# Patient Record
Sex: MALE | Race: BLACK OR AFRICAN AMERICAN | NOT HISPANIC OR LATINO | URBAN - METROPOLITAN AREA
[De-identification: names, ages, dates, MRNs, and addresses within clinical notes are randomized per-mention and may not be internally consistent; named-entity substitution may affect disease eponyms.]

---

## 2023-09-28 ENCOUNTER — INPATIENT (INPATIENT)
Facility: HOSPITAL | Age: 88
LOS: 6 days | Discharge: HOME | DRG: 689 | End: 2023-10-05
Attending: HOSPITALIST | Admitting: FAMILY MEDICINE
Payer: MEDICARE

## 2023-09-28 VITALS
OXYGEN SATURATION: 100 % | WEIGHT: 137.13 LBS | DIASTOLIC BLOOD PRESSURE: 59 MMHG | HEART RATE: 58 BPM | RESPIRATION RATE: 16 BRPM | TEMPERATURE: 98 F | SYSTOLIC BLOOD PRESSURE: 115 MMHG

## 2023-09-28 DIAGNOSIS — F01.50 VASCULAR DEMENTIA, UNSPECIFIED SEVERITY, WITHOUT BEHAVIORAL DISTURBANCE, PSYCHOTIC DISTURBANCE, MOOD DISTURBANCE, AND ANXIETY: ICD-10-CM

## 2023-09-28 DIAGNOSIS — R13.10 DYSPHAGIA, UNSPECIFIED: ICD-10-CM

## 2023-09-28 DIAGNOSIS — D61.818 OTHER PANCYTOPENIA: ICD-10-CM

## 2023-09-28 DIAGNOSIS — I10 ESSENTIAL (PRIMARY) HYPERTENSION: ICD-10-CM

## 2023-09-28 DIAGNOSIS — T68.XXXA HYPOTHERMIA, INITIAL ENCOUNTER: ICD-10-CM

## 2023-09-28 DIAGNOSIS — G93.40 ENCEPHALOPATHY, UNSPECIFIED: ICD-10-CM

## 2023-09-28 DIAGNOSIS — Z29.9 ENCOUNTER FOR PROPHYLACTIC MEASURES, UNSPECIFIED: ICD-10-CM

## 2023-09-28 DIAGNOSIS — G93.41 METABOLIC ENCEPHALOPATHY: ICD-10-CM

## 2023-09-28 DIAGNOSIS — N39.0 URINARY TRACT INFECTION, SITE NOT SPECIFIED: ICD-10-CM

## 2023-09-28 LAB
ALBUMIN SERPL ELPH-MCNC: 2.6 G/DL — LOW (ref 3.3–5)
ALP SERPL-CCNC: 70 U/L — SIGNIFICANT CHANGE UP (ref 40–120)
ALT FLD-CCNC: 42 U/L — SIGNIFICANT CHANGE UP (ref 12–78)
ANION GAP SERPL CALC-SCNC: 5 MMOL/L — SIGNIFICANT CHANGE UP (ref 5–17)
APTT BLD: 28.5 SEC — SIGNIFICANT CHANGE UP (ref 24.5–35.6)
AST SERPL-CCNC: 27 U/L — SIGNIFICANT CHANGE UP (ref 15–37)
BASOPHILS # BLD AUTO: 0.01 K/UL — SIGNIFICANT CHANGE UP (ref 0–0.2)
BASOPHILS NFR BLD AUTO: 0.2 % — SIGNIFICANT CHANGE UP (ref 0–2)
BILIRUB SERPL-MCNC: 0.6 MG/DL — SIGNIFICANT CHANGE UP (ref 0.2–1.2)
BUN SERPL-MCNC: 22 MG/DL — SIGNIFICANT CHANGE UP (ref 7–23)
CALCIUM SERPL-MCNC: 8.9 MG/DL — SIGNIFICANT CHANGE UP (ref 8.5–10.1)
CHLORIDE SERPL-SCNC: 109 MMOL/L — HIGH (ref 96–108)
CO2 SERPL-SCNC: 27 MMOL/L — SIGNIFICANT CHANGE UP (ref 22–31)
CREAT SERPL-MCNC: 0.88 MG/DL — SIGNIFICANT CHANGE UP (ref 0.5–1.3)
EGFR: 79 ML/MIN/1.73M2 — SIGNIFICANT CHANGE UP
EOSINOPHIL # BLD AUTO: 0.06 K/UL — SIGNIFICANT CHANGE UP (ref 0–0.5)
EOSINOPHIL NFR BLD AUTO: 1.2 % — SIGNIFICANT CHANGE UP (ref 0–6)
GLUCOSE SERPL-MCNC: 76 MG/DL — SIGNIFICANT CHANGE UP (ref 70–99)
HCT VFR BLD CALC: 28 % — LOW (ref 39–50)
HGB BLD-MCNC: 9 G/DL — LOW (ref 13–17)
IMM GRANULOCYTES NFR BLD AUTO: 0.2 % — SIGNIFICANT CHANGE UP (ref 0–0.9)
INR BLD: 0.98 RATIO — SIGNIFICANT CHANGE UP (ref 0.85–1.18)
LACTATE SERPL-SCNC: 1.2 MMOL/L — SIGNIFICANT CHANGE UP (ref 0.7–2)
LYMPHOCYTES # BLD AUTO: 0.39 K/UL — LOW (ref 1–3.3)
LYMPHOCYTES # BLD AUTO: 7.8 % — LOW (ref 13–44)
MCHC RBC-ENTMCNC: 28.9 PG — SIGNIFICANT CHANGE UP (ref 27–34)
MCHC RBC-ENTMCNC: 32.1 GM/DL — SIGNIFICANT CHANGE UP (ref 32–36)
MCV RBC AUTO: 90 FL — SIGNIFICANT CHANGE UP (ref 80–100)
MONOCYTES # BLD AUTO: 0.43 K/UL — SIGNIFICANT CHANGE UP (ref 0–0.9)
MONOCYTES NFR BLD AUTO: 8.6 % — SIGNIFICANT CHANGE UP (ref 2–14)
NEUTROPHILS # BLD AUTO: 4.11 K/UL — SIGNIFICANT CHANGE UP (ref 1.8–7.4)
NEUTROPHILS NFR BLD AUTO: 82 % — HIGH (ref 43–77)
NRBC # BLD: 0 /100 WBCS — SIGNIFICANT CHANGE UP (ref 0–0)
PLATELET # BLD AUTO: 91 K/UL — LOW (ref 150–400)
POTASSIUM SERPL-MCNC: 3.7 MMOL/L — SIGNIFICANT CHANGE UP (ref 3.5–5.3)
POTASSIUM SERPL-SCNC: 3.7 MMOL/L — SIGNIFICANT CHANGE UP (ref 3.5–5.3)
PROT SERPL-MCNC: 6.3 G/DL — SIGNIFICANT CHANGE UP (ref 6–8.3)
PROTHROM AB SERPL-ACNC: 11.5 SEC — SIGNIFICANT CHANGE UP (ref 9.5–13)
RAPID RVP RESULT: SIGNIFICANT CHANGE UP
RBC # BLD: 3.11 M/UL — LOW (ref 4.2–5.8)
RBC # FLD: 14.1 % — SIGNIFICANT CHANGE UP (ref 10.3–14.5)
SARS-COV-2 RNA SPEC QL NAA+PROBE: SIGNIFICANT CHANGE UP
SODIUM SERPL-SCNC: 141 MMOL/L — SIGNIFICANT CHANGE UP (ref 135–145)
TROPONIN I, HIGH SENSITIVITY RESULT: 26.8 NG/L — SIGNIFICANT CHANGE UP
TSH SERPL-MCNC: 3.45 UIU/ML — SIGNIFICANT CHANGE UP (ref 0.36–3.74)
WBC # BLD: 5.01 K/UL — SIGNIFICANT CHANGE UP (ref 3.8–10.5)
WBC # FLD AUTO: 5.01 K/UL — SIGNIFICANT CHANGE UP (ref 3.8–10.5)

## 2023-09-28 PROCEDURE — 99497 ADVNCD CARE PLAN 30 MIN: CPT | Mod: GC,25

## 2023-09-28 PROCEDURE — 71045 X-RAY EXAM CHEST 1 VIEW: CPT | Mod: 26

## 2023-09-28 PROCEDURE — 93010 ELECTROCARDIOGRAM REPORT: CPT

## 2023-09-28 PROCEDURE — 99285 EMERGENCY DEPT VISIT HI MDM: CPT

## 2023-09-28 PROCEDURE — 99223 1ST HOSP IP/OBS HIGH 75: CPT | Mod: GC

## 2023-09-28 RX ORDER — ESCITALOPRAM OXALATE 10 MG/1
1 TABLET, FILM COATED ORAL
Refills: 0 | DISCHARGE

## 2023-09-28 RX ORDER — CALCIUM CARBONATE 500(1250)
2 TABLET ORAL
Refills: 0 | DISCHARGE

## 2023-09-28 RX ORDER — CEFTRIAXONE 500 MG/1
1000 INJECTION, POWDER, FOR SOLUTION INTRAMUSCULAR; INTRAVENOUS EVERY 24 HOURS
Refills: 0 | Status: DISCONTINUED | OUTPATIENT
Start: 2023-09-29 | End: 2023-10-03

## 2023-09-28 RX ORDER — QUETIAPINE FUMARATE 200 MG/1
25 TABLET, FILM COATED ORAL
Refills: 0 | Status: DISCONTINUED | OUTPATIENT
Start: 2023-09-28 | End: 2023-10-05

## 2023-09-28 RX ORDER — HYDRALAZINE HCL 50 MG
10 TABLET ORAL THREE TIMES A DAY
Refills: 0 | Status: DISCONTINUED | OUTPATIENT
Start: 2023-09-28 | End: 2023-09-30

## 2023-09-28 RX ORDER — LOSARTAN POTASSIUM 100 MG/1
100 TABLET, FILM COATED ORAL DAILY
Refills: 0 | Status: DISCONTINUED | OUTPATIENT
Start: 2023-09-28 | End: 2023-10-05

## 2023-09-28 RX ORDER — SODIUM CHLORIDE 9 MG/ML
1000 INJECTION INTRAMUSCULAR; INTRAVENOUS; SUBCUTANEOUS ONCE
Refills: 0 | Status: COMPLETED | OUTPATIENT
Start: 2023-09-28 | End: 2023-09-28

## 2023-09-28 RX ORDER — ACETAMINOPHEN 500 MG
650 TABLET ORAL EVERY 6 HOURS
Refills: 0 | Status: DISCONTINUED | OUTPATIENT
Start: 2023-09-28 | End: 2023-10-05

## 2023-09-28 RX ORDER — ACETAMINOPHEN 500 MG
2 TABLET ORAL
Refills: 0 | DISCHARGE

## 2023-09-28 RX ORDER — CHOLECALCIFEROL (VITAMIN D3) 125 MCG
2000 CAPSULE ORAL DAILY
Refills: 0 | Status: DISCONTINUED | OUTPATIENT
Start: 2023-09-28 | End: 2023-10-05

## 2023-09-28 RX ORDER — HYDRALAZINE HCL 50 MG
1 TABLET ORAL
Refills: 0 | DISCHARGE

## 2023-09-28 RX ORDER — AMLODIPINE BESYLATE 2.5 MG/1
1 TABLET ORAL
Refills: 0 | DISCHARGE

## 2023-09-28 RX ORDER — ONDANSETRON 8 MG/1
4 TABLET, FILM COATED ORAL EVERY 8 HOURS
Refills: 0 | Status: DISCONTINUED | OUTPATIENT
Start: 2023-09-28 | End: 2023-10-05

## 2023-09-28 RX ORDER — LANOLIN ALCOHOL/MO/W.PET/CERES
3 CREAM (GRAM) TOPICAL AT BEDTIME
Refills: 0 | Status: DISCONTINUED | OUTPATIENT
Start: 2023-09-28 | End: 2023-10-05

## 2023-09-28 RX ORDER — SODIUM CHLORIDE 9 MG/ML
1000 INJECTION INTRAMUSCULAR; INTRAVENOUS; SUBCUTANEOUS
Refills: 0 | Status: DISCONTINUED | OUTPATIENT
Start: 2023-09-28 | End: 2023-09-28

## 2023-09-28 RX ORDER — HYDROCHLOROTHIAZIDE 25 MG
1 TABLET ORAL
Refills: 0 | DISCHARGE

## 2023-09-28 RX ORDER — SOD,AMMONIUM,POTASSIUM LACTATE
1 CREAM (GRAM) TOPICAL
Refills: 0 | DISCHARGE

## 2023-09-28 RX ORDER — TRAZODONE HCL 50 MG
100 TABLET ORAL AT BEDTIME
Refills: 0 | Status: DISCONTINUED | OUTPATIENT
Start: 2023-09-28 | End: 2023-10-05

## 2023-09-28 RX ORDER — SOD,AMMONIUM,POTASSIUM LACTATE
1 CREAM (GRAM) TOPICAL
Refills: 0 | Status: DISCONTINUED | OUTPATIENT
Start: 2023-09-28 | End: 2023-10-05

## 2023-09-28 RX ORDER — CHOLECALCIFEROL (VITAMIN D3) 125 MCG
1 CAPSULE ORAL
Refills: 0 | DISCHARGE

## 2023-09-28 RX ORDER — BRIMONIDINE TARTRATE 2 MG/MG
1 SOLUTION/ DROPS OPHTHALMIC
Refills: 0 | Status: DISCONTINUED | OUTPATIENT
Start: 2023-09-28 | End: 2023-10-05

## 2023-09-28 RX ORDER — DOCUSATE SODIUM 100 MG
1 CAPSULE ORAL
Refills: 0 | DISCHARGE

## 2023-09-28 RX ORDER — SODIUM CHLORIDE 9 MG/ML
1000 INJECTION INTRAMUSCULAR; INTRAVENOUS; SUBCUTANEOUS
Refills: 0 | Status: DISCONTINUED | OUTPATIENT
Start: 2023-09-28 | End: 2023-10-05

## 2023-09-28 RX ORDER — ENOXAPARIN SODIUM 100 MG/ML
30 INJECTION SUBCUTANEOUS
Refills: 0 | DISCHARGE

## 2023-09-28 RX ORDER — AMLODIPINE BESYLATE 2.5 MG/1
5 TABLET ORAL DAILY
Refills: 0 | Status: DISCONTINUED | OUTPATIENT
Start: 2023-09-28 | End: 2023-10-05

## 2023-09-28 RX ORDER — LOSARTAN POTASSIUM 100 MG/1
1 TABLET, FILM COATED ORAL
Refills: 0 | DISCHARGE

## 2023-09-28 RX ORDER — ESCITALOPRAM OXALATE 10 MG/1
10 TABLET, FILM COATED ORAL DAILY
Refills: 0 | Status: DISCONTINUED | OUTPATIENT
Start: 2023-09-28 | End: 2023-10-05

## 2023-09-28 RX ORDER — PIPERACILLIN AND TAZOBACTAM 4; .5 G/20ML; G/20ML
3.38 INJECTION, POWDER, LYOPHILIZED, FOR SOLUTION INTRAVENOUS ONCE
Refills: 0 | Status: COMPLETED | OUTPATIENT
Start: 2023-09-28 | End: 2023-09-28

## 2023-09-28 RX ORDER — QUETIAPINE FUMARATE 200 MG/1
1 TABLET, FILM COATED ORAL
Refills: 0 | DISCHARGE

## 2023-09-28 RX ORDER — LATANOPROST 0.05 MG/ML
1 SOLUTION/ DROPS OPHTHALMIC; TOPICAL AT BEDTIME
Refills: 0 | Status: DISCONTINUED | OUTPATIENT
Start: 2023-09-28 | End: 2023-10-05

## 2023-09-28 RX ORDER — LANOLIN ALCOHOL/MO/W.PET/CERES
1 CREAM (GRAM) TOPICAL
Refills: 0 | DISCHARGE

## 2023-09-28 RX ORDER — CALCIUM CARBONATE 500(1250)
1 TABLET ORAL DAILY
Refills: 0 | Status: DISCONTINUED | OUTPATIENT
Start: 2023-09-28 | End: 2023-10-05

## 2023-09-28 RX ORDER — LATANOPROST 0.05 MG/ML
1 SOLUTION/ DROPS OPHTHALMIC; TOPICAL
Refills: 0 | DISCHARGE

## 2023-09-28 RX ORDER — BRIMONIDINE TARTRATE 2 MG/MG
1 SOLUTION/ DROPS OPHTHALMIC
Refills: 0 | DISCHARGE

## 2023-09-28 RX ADMIN — SODIUM CHLORIDE 1000 MILLILITER(S): 9 INJECTION INTRAMUSCULAR; INTRAVENOUS; SUBCUTANEOUS at 19:30

## 2023-09-28 RX ADMIN — PIPERACILLIN AND TAZOBACTAM 200 GRAM(S): 4; .5 INJECTION, POWDER, LYOPHILIZED, FOR SOLUTION INTRAVENOUS at 17:30

## 2023-09-28 RX ADMIN — LATANOPROST 1 DROP(S): 0.05 SOLUTION/ DROPS OPHTHALMIC; TOPICAL at 23:45

## 2023-09-28 NOTE — H&P ADULT - CONVERSATION DETAILS
Spoke extensively with Luz Maria Kris, patient's niece. She says the patient has had worsening mentation and health over the past several years, especially over the past 2 months. When asked about DNR/DNI status, patient said "I want him die with dignity" and "he needs rest". Patient expressed understanding about current diagnosis, prognosis, and recurrence / worsening of symptoms given frequent UTIs. She is currently unable to visit the hospital as she does not drive, but will wait for another family member to assist her. She requests to be called every day.

## 2023-09-28 NOTE — H&P ADULT - NSICDXPASTMEDICALHX_GEN_ALL_CORE_FT
PAST MEDICAL HISTORY:  Dementia     Dysphagia     GERD (gastroesophageal reflux disease)     HLD (hyperlipidemia)     HTN (hypertension)

## 2023-09-28 NOTE — PATIENT PROFILE ADULT - FUNCTIONAL ASSESSMENT - BASIC MOBILITY 6.
1-calculated by average/Not able to assess (calculate score using [facility] averaging method)

## 2023-09-28 NOTE — H&P ADULT - HISTORY OF PRESENT ILLNESS
89 y/o M with PMHx vascular dementia, dysphagia, pancytopenia, HTN, GERD, constipation presenting to the ED from [facility] nursing home with worsening agitation and confusion. Patient is minimally verbal at baseline. Most HPI obtained from niece via phone.  He was hospitalized in [place] for a UTI in August. Afterwards, family decided patient could not live alone, and moved him to [facility] here in [place] in late August. Shortly afterwards, patient was admitted to [facility] for a UTI. He was discharge back to the nursing home. However, yesterday and today patient was more agitated and confused. Rocephin was started in nursing home for suspected UTI, and he was transferred to [facility]. Patient unable to participate in ROS, but does not appear in distress or discomfort.     ED course    Vitals: 97.8 F, HR 58, /59, RR 16, SpO2 100% RA  Labs: Hgb 9, Hct 28, Plt 91, Alb 2.6  EKG: NSR @ 67bpm,   Given: Zosyn, 1L NS bolus    CXR: no acute cardiopulmonary disease (personal read)

## 2023-09-28 NOTE — H&P ADULT - NSHPPHYSICALEXAM_GEN_ALL_CORE
T(C): 34.8 (09-28-23 @ 19:34), Max: 36.6 (09-28-23 @ 15:22)  HR: 58 (09-28-23 @ 15:22) (58 - 58)  BP: 115/59 (09-28-23 @ 15:22) (115/59 - 115/59)  RR: 16 (09-28-23 @ 15:22) (16 - 16)  SpO2: 100% (09-28-23 @ 15:22) (100% - 100%)    GENERAL: +cachectic, NAD  EYES: sclera clear, no exudates  ENMT: +leukoplakia, +dry mucous membranes  NECK: supple, soft, no thyromegaly noted  LUNGS: good air entry bilaterally, clear to auscultation, symmetric breath sounds, no wheezing or rhonchi appreciated  HEART: soft S1/S2, regular rate and rhythm, +systolic murmurs, no lower extremity edema  GASTROINTESTINAL: abdomen is soft, nontender, nondistended, normoactive bowel sounds, no palpable masses  INTEGUMENT: +poor skin turgor, no lesions noted  MUSCULOSKELETAL: no clubbing or cyanosis, no obvious deformity  NEUROLOGIC: +unable to assess mental state or complete full neurologic exam, moving all 4 limbs equally  HEME/LYMPH: no palpable supraclavicular nodules, no obvious ecchymosis or petechiae

## 2023-09-28 NOTE — H&P ADULT - PROBLEM SELECTOR PLAN 1
Recurrent UTIs over the past 2 months, each requiring hospitalization. Now p/w encephalopathy likely metabolic 2/2 UTI  - Hypothermic, other VSS. Lactate wnl, normal WBC  - s/p Rocephin in nursing home, and Zosyn in ED. Continue with Rocephin  - f/u blood and urine cultures, and UA. Urine will be collected after initiation of abx  - Continue warming blanket and bear-hugger. Tylenol prn fevers  - IVF  - ID (Dr. Gonzalez) consult

## 2023-09-28 NOTE — ED ADULT TRIAGE NOTE - CHIEF COMPLAINT QUOTE
c/o increased confusion for a few days- on inj rocephin Iv for urinary tract infection has an IV #24 on the right wrist from the nursing home

## 2023-09-28 NOTE — H&P ADULT - NSHPSOCIALHISTORY_GEN_ALL_CORE
Tobacco: former  Alcohol: denies  Recreational drugs: denies    Lives: at [facility]  ADLs: dependent  Ambulation: limited, with assistance

## 2023-09-28 NOTE — ED ADULT NURSE NOTE - OBJECTIVE STATEMENT
Pt received in bed alert and confuse and agitated with the c/o increase in confusion. As per Md's orders IV eliezer placed in right hand. Pt hypothermic and matt hugger applied and tolerating well. Nursing care ongoing and safety maintained.

## 2023-09-28 NOTE — ED PROVIDER NOTE - CLINICAL SUMMARY MEDICAL DECISION MAKING FREE TEXT BOX
89yo M ho dementia, encephalopathy, htn, bibems from nursing home for worsening agitation and dementia. pt unable to provide history, nonverbal, but per ems has been more agitation. per notes also states he is being treated for UTI with ceftriaxone  will check labs, temp, lytes, ua, reassess

## 2023-09-28 NOTE — H&P ADULT - PROBLEM SELECTOR PLAN 2
Chronic. Unknown baselines  - Hgb on admission 9, no recent bleeding per family  - order FOBT  - Plt 91 on admission, unknown etiology. Patient receives Lovenox 30mg qd at nursing home  - Heme/Onc consult if family agreeable Chronic. Unknown baselines  - Hgb on admission 9, no recent bleeding per family  - If hgb downtrends further, consider FOBT + anemia workup  - Check iron studies   - Plt 91 on admission, unknown etiology. Patient receives Lovenox 30mg qd at nursing home  - Hold chemical DVT ppx   - Heme/Onc consult only if family agreeable

## 2023-09-28 NOTE — ED PROVIDER NOTE - PHYSICAL EXAMINATION
Gen: NAD  Head: NC/AT  Neck: trachea midline  Resp:  No distress  cv: rr  abd nontender   Ext: no deformities  Neuro:  moving extremities, nonverbal, not folowing commands   Skin:  Warm and dry as visualized

## 2023-09-28 NOTE — H&P ADULT - PROBLEM SELECTOR PLAN 3
Chronic, progressive. Worsening agitation for several months, minimally verbal, cachectic   - Cont Zyprexa, Trazadone, Lexapro  - Palliative eval

## 2023-09-28 NOTE — H&P ADULT - PROBLEM SELECTOR PLAN 4
H/o dysphagia. Unknown etiology  - Diet in nursing home: pureed w/ thicken liquids   - Nutrition eval

## 2023-09-28 NOTE — ED ADULT NURSE NOTE - NSFALLHARMRISKINTERV_ED_ALL_ED
Assistance OOB with selected safe patient handling equipment if applicable/Assistance with ambulation/Communicate risk of Fall with Harm to all staff, patient, and family/Monitor gait and stability/Monitor for mental status changes and reorient to person, place, and time, as needed/Provide visual cue: red socks, yellow wristband, yellow gown, etc/Reinforce activity limits and safety measures with patient and family/Toileting schedule using arm’s reach rule for commode and bathroom/Use of alarms - bed, stretcher, chair and/or video monitoring/Bed in lowest position, wheels locked, appropriate side rails in place/Call bell, personal items and telephone in reach/Instruct patient to call for assistance before getting out of bed/chair/stretcher/Non-slip footwear applied when patient is off stretcher/Shirley to call system/Physically safe environment - no spills, clutter or unnecessary equipment/Purposeful Proactive Rounding/Room/bathroom lighting operational, light cord in reach

## 2023-09-28 NOTE — H&P ADULT - NS PRO AD PATIENT TYPE
Left the patient a message requesting she calls the office.    Health Care Proxy (HCP)/Living Will/Power of

## 2023-09-28 NOTE — PATIENT PROFILE ADULT - HEALTH LITERACY
no Reason To Defer Override: pt scheduled apt to excise cyst, wants to push out for later date. Detail Level: Detailed Introduction Text (Please End With A Colon): Deferred Size Of Lesion In Cm (Optional): 0

## 2023-09-28 NOTE — PATIENT PROFILE ADULT - FALL HARM RISK - HARM RISK INTERVENTIONS

## 2023-09-28 NOTE — H&P ADULT - ATTENDING COMMENTS
89 y/o M with PMHx vascular dementia, dysphagia, pancytopenia, HTN, GERD, constipation presenting to the ED from [facility] home with worsening agitation and confusion. Admitted for suspected UTI and metabolic encephalopathy Plan: f/u cultures, cont antibx for now, trend fever curve, apprec ID recs, MOLST completed GOC/Advance care planning as above, monitor clinical course, apprec palliative consult

## 2023-09-28 NOTE — ED PROVIDER NOTE - WR ORDER DATE AND TIME 1
Problem: Diabetes/Glucose Control  Goal: Glucose maintained within prescribed range  INTERVENTIONS:  - Monitor Blood Glucose as ordered  - Assess for signs and symptoms of hyperglycemia and hypoglycemia  - Administer ordered medications to maintain glucose 28-Sep-2023 15:56

## 2023-09-28 NOTE — ED PROVIDER NOTE - OBJECTIVE STATEMENT
89yo M ho dementia, encephalopathy, htn, bibems from nursing home for worsening agitation and dementia. pt unable to provide history, nonverbal, but per ems has been more agitation. per notes also states he is being treated for UTI with ceftriaxone

## 2023-09-28 NOTE — H&P ADULT - ASSESSMENT
89 y/o M with PMHx vascular dementia, dysphagia, pancytopenia, HTN, GERD, constipation presenting to the ED from [facility] nursing home with worsening agitation and confusion. Admitted for suspected UTI and metabolic encephalopathy

## 2023-09-29 LAB
ALBUMIN SERPL ELPH-MCNC: 2.4 G/DL — LOW (ref 3.3–5)
ALP SERPL-CCNC: 62 U/L — SIGNIFICANT CHANGE UP (ref 40–120)
ALT FLD-CCNC: 36 U/L — SIGNIFICANT CHANGE UP (ref 12–78)
ANION GAP SERPL CALC-SCNC: 8 MMOL/L — SIGNIFICANT CHANGE UP (ref 5–17)
APPEARANCE UR: CLEAR — SIGNIFICANT CHANGE UP
APTT BLD: 24.9 SEC — SIGNIFICANT CHANGE UP (ref 24.5–35.6)
AST SERPL-CCNC: 23 U/L — SIGNIFICANT CHANGE UP (ref 15–37)
BACTERIA # UR AUTO: ABNORMAL /HPF
BASOPHILS # BLD AUTO: 0 K/UL — SIGNIFICANT CHANGE UP (ref 0–0.2)
BASOPHILS NFR BLD AUTO: 0 % — SIGNIFICANT CHANGE UP (ref 0–2)
BILIRUB SERPL-MCNC: 0.9 MG/DL — SIGNIFICANT CHANGE UP (ref 0.2–1.2)
BILIRUB UR-MCNC: NEGATIVE — SIGNIFICANT CHANGE UP
BUN SERPL-MCNC: 22 MG/DL — SIGNIFICANT CHANGE UP (ref 7–23)
CALCIUM SERPL-MCNC: 8.7 MG/DL — SIGNIFICANT CHANGE UP (ref 8.5–10.1)
CHLORIDE SERPL-SCNC: 109 MMOL/L — HIGH (ref 96–108)
CO2 SERPL-SCNC: 24 MMOL/L — SIGNIFICANT CHANGE UP (ref 22–31)
COLOR SPEC: YELLOW — SIGNIFICANT CHANGE UP
COMMENT - URINE: SIGNIFICANT CHANGE UP
CORTIS AM PEAK SERPL-MCNC: 9 UG/DL — SIGNIFICANT CHANGE UP (ref 6–18.4)
CREAT SERPL-MCNC: 1 MG/DL — SIGNIFICANT CHANGE UP (ref 0.5–1.3)
DIFF PNL FLD: ABNORMAL
EGFR: 69 ML/MIN/1.73M2 — SIGNIFICANT CHANGE UP
EOSINOPHIL # BLD AUTO: 0.01 K/UL — SIGNIFICANT CHANGE UP (ref 0–0.5)
EOSINOPHIL NFR BLD AUTO: 0.2 % — SIGNIFICANT CHANGE UP (ref 0–6)
EPI CELLS # UR: SIGNIFICANT CHANGE UP
FERRITIN SERPL-MCNC: 154 NG/ML — SIGNIFICANT CHANGE UP (ref 30–400)
GLUCOSE SERPL-MCNC: 66 MG/DL — LOW (ref 70–99)
GLUCOSE UR QL: NEGATIVE MG/DL — SIGNIFICANT CHANGE UP
HCT VFR BLD CALC: 24.3 % — LOW (ref 39–50)
HGB BLD-MCNC: 8.1 G/DL — LOW (ref 13–17)
IMM GRANULOCYTES NFR BLD AUTO: 0.2 % — SIGNIFICANT CHANGE UP (ref 0–0.9)
INR BLD: 1.08 RATIO — SIGNIFICANT CHANGE UP (ref 0.85–1.18)
IRON SATN MFR SERPL: 28 % — SIGNIFICANT CHANGE UP (ref 16–55)
IRON SATN MFR SERPL: 55 UG/DL — SIGNIFICANT CHANGE UP (ref 45–165)
KETONES UR-MCNC: ABNORMAL MG/DL
LEUKOCYTE ESTERASE UR-ACNC: ABNORMAL
LYMPHOCYTES # BLD AUTO: 0.35 K/UL — LOW (ref 1–3.3)
LYMPHOCYTES # BLD AUTO: 7.3 % — LOW (ref 13–44)
MCHC RBC-ENTMCNC: 29.6 PG — SIGNIFICANT CHANGE UP (ref 27–34)
MCHC RBC-ENTMCNC: 33.3 GM/DL — SIGNIFICANT CHANGE UP (ref 32–36)
MCV RBC AUTO: 88.7 FL — SIGNIFICANT CHANGE UP (ref 80–100)
MONOCYTES # BLD AUTO: 0.48 K/UL — SIGNIFICANT CHANGE UP (ref 0–0.9)
MONOCYTES NFR BLD AUTO: 10 % — SIGNIFICANT CHANGE UP (ref 2–14)
NEUTROPHILS # BLD AUTO: 3.96 K/UL — SIGNIFICANT CHANGE UP (ref 1.8–7.4)
NEUTROPHILS NFR BLD AUTO: 82.3 % — HIGH (ref 43–77)
NITRITE UR-MCNC: NEGATIVE — SIGNIFICANT CHANGE UP
NRBC # BLD: 0 /100 WBCS — SIGNIFICANT CHANGE UP (ref 0–0)
PH UR: 7 — SIGNIFICANT CHANGE UP (ref 5–8)
PLATELET # BLD AUTO: 89 K/UL — LOW (ref 150–400)
POTASSIUM SERPL-MCNC: 3.7 MMOL/L — SIGNIFICANT CHANGE UP (ref 3.5–5.3)
POTASSIUM SERPL-SCNC: 3.7 MMOL/L — SIGNIFICANT CHANGE UP (ref 3.5–5.3)
PROT SERPL-MCNC: 6.1 G/DL — SIGNIFICANT CHANGE UP (ref 6–8.3)
PROT UR-MCNC: NEGATIVE MG/DL — SIGNIFICANT CHANGE UP
PROTHROM AB SERPL-ACNC: 12.6 SEC — SIGNIFICANT CHANGE UP (ref 9.5–13)
RBC # BLD: 2.74 M/UL — LOW (ref 4.2–5.8)
RBC # FLD: 14.2 % — SIGNIFICANT CHANGE UP (ref 10.3–14.5)
RBC CASTS # UR COMP ASSIST: ABNORMAL /HPF
SODIUM SERPL-SCNC: 141 MMOL/L — SIGNIFICANT CHANGE UP (ref 135–145)
SP GR SPEC: 1.02 — SIGNIFICANT CHANGE UP (ref 1–1.03)
TIBC SERPL-MCNC: 200 UG/DL — LOW (ref 220–430)
TRANSFERRIN SERPL-MCNC: 138 MG/DL — LOW (ref 200–360)
UIBC SERPL-MCNC: 145 UG/DL — SIGNIFICANT CHANGE UP (ref 110–370)
UROBILINOGEN FLD QL: 2 MG/DL (ref 0.2–1)
WBC # BLD: 4.81 K/UL — SIGNIFICANT CHANGE UP (ref 3.8–10.5)
WBC # FLD AUTO: 4.81 K/UL — SIGNIFICANT CHANGE UP (ref 3.8–10.5)
WBC UR QL: 1 /HPF — SIGNIFICANT CHANGE UP (ref 0–5)

## 2023-09-29 PROCEDURE — 99233 SBSQ HOSP IP/OBS HIGH 50: CPT

## 2023-09-29 RX ORDER — OLANZAPINE 15 MG/1
2.5 TABLET, FILM COATED ORAL EVERY 6 HOURS
Refills: 0 | Status: DISCONTINUED | OUTPATIENT
Start: 2023-09-29 | End: 2023-10-04

## 2023-09-29 RX ORDER — QUETIAPINE FUMARATE 200 MG/1
50 TABLET, FILM COATED ORAL AT BEDTIME
Refills: 0 | Status: DISCONTINUED | OUTPATIENT
Start: 2023-09-29 | End: 2023-10-01

## 2023-09-29 RX ADMIN — Medication 1 APPLICATION(S): at 17:28

## 2023-09-29 RX ADMIN — LATANOPROST 1 DROP(S): 0.05 SOLUTION/ DROPS OPHTHALMIC; TOPICAL at 21:15

## 2023-09-29 RX ADMIN — Medication 1 TABLET(S): at 11:46

## 2023-09-29 RX ADMIN — Medication 100 MILLIGRAM(S): at 21:15

## 2023-09-29 RX ADMIN — Medication 3 MILLIGRAM(S): at 21:15

## 2023-09-29 RX ADMIN — Medication 2000 UNIT(S): at 11:46

## 2023-09-29 RX ADMIN — CEFTRIAXONE 100 MILLIGRAM(S): 500 INJECTION, POWDER, FOR SOLUTION INTRAMUSCULAR; INTRAVENOUS at 11:46

## 2023-09-29 RX ADMIN — BRIMONIDINE TARTRATE 1 DROP(S): 2 SOLUTION/ DROPS OPHTHALMIC at 17:28

## 2023-09-29 RX ADMIN — QUETIAPINE FUMARATE 25 MILLIGRAM(S): 200 TABLET, FILM COATED ORAL at 17:28

## 2023-09-29 RX ADMIN — BRIMONIDINE TARTRATE 1 DROP(S): 2 SOLUTION/ DROPS OPHTHALMIC at 05:52

## 2023-09-29 RX ADMIN — Medication 650 MILLIGRAM(S): at 07:23

## 2023-09-29 RX ADMIN — Medication 650 MILLIGRAM(S): at 06:59

## 2023-09-29 RX ADMIN — LOSARTAN POTASSIUM 100 MILLIGRAM(S): 100 TABLET, FILM COATED ORAL at 05:51

## 2023-09-29 RX ADMIN — Medication 1 APPLICATION(S): at 05:51

## 2023-09-29 RX ADMIN — ESCITALOPRAM OXALATE 10 MILLIGRAM(S): 10 TABLET, FILM COATED ORAL at 11:46

## 2023-09-29 RX ADMIN — AMLODIPINE BESYLATE 5 MILLIGRAM(S): 2.5 TABLET ORAL at 05:52

## 2023-09-29 RX ADMIN — QUETIAPINE FUMARATE 50 MILLIGRAM(S): 200 TABLET, FILM COATED ORAL at 21:14

## 2023-09-29 RX ADMIN — Medication 10 MILLIGRAM(S): at 21:15

## 2023-09-29 RX ADMIN — Medication 10 MILLIGRAM(S): at 13:02

## 2023-09-29 RX ADMIN — Medication 10 MILLIGRAM(S): at 05:51

## 2023-09-29 RX ADMIN — QUETIAPINE FUMARATE 25 MILLIGRAM(S): 200 TABLET, FILM COATED ORAL at 05:53

## 2023-09-29 NOTE — CARE COORDINATION ASSESSMENT. - MET/SPOKE WITH
SW spoke with pt's niece to confirm plan for return to [facility] upon DC/patient/caregiver

## 2023-09-29 NOTE — DIETITIAN INITIAL EVALUATION ADULT - ADD RECOMMEND
1) Continue current diet rx  2) Recommend ensure plus HP BID (350kcal, 20gm protein per 8 fl oz serving), will also provide magic cup fortified ice cream daily (290kcal, 9gm protein per 4 oz cup)  3) Monitor po intake, diet tolerance, weight trends, labs, GI function, skin integrity

## 2023-09-29 NOTE — DIETITIAN NUTRITION RISK NOTIFICATION - TREATMENT: THE FOLLOWING DIET HAS BEEN RECOMMENDED
Diet, Pureed:   DASH/TLC {Sodium & Cholesterol Restricted}  Mildly Thick Liquids (MILDTHICKLIQS) (09-28-23 @ 21:31) [Active]

## 2023-09-29 NOTE — PROGRESS NOTE ADULT - PROBLEM SELECTOR PLAN 2
Chronic. Unknown baselines  - Hgb on admission 9, no recent bleeding per family  - If hgb downtrends further, consider FOBT + anemia workup  - Check iron studies   - Plt 91 on admission, unknown etiology. Patient receives Lovenox 30mg qd at nursing home  - Hold chemical DVT ppx   - Heme/Onc consult only if family agreeable Chronic, progressive. Worsening agitation for several months, minimally verbal, cachectic   - Cont Zyprexa, Trazadone, Lexapro  - Palliative eval  -DNR/DNI

## 2023-09-29 NOTE — DIETITIAN INITIAL EVALUATION ADULT - SIGNS/SYMPTOMS
as evidenced by hx of dysphagia, on puree consistency diet with mildly thick liquids PTA.  as evidenced by NFPE findings- moderate/severe muscle depletion/fat loss.

## 2023-09-29 NOTE — CONSULT NOTE ADULT - SUBJECTIVE AND OBJECTIVE BOX
[facility] DIVISION of INFECTIOUS DISEASE  Haris Gonzalez MD PhD, Clara Lau MD, Robyn Lino MD, Celia Davenport MD, Brad Shields MD  and providing coverage with Nahum Garcia MD  Providing Infectious Disease Consultations at [facility]    Office# [phone] to schedule follow up appointments  Answering Service for urgent calls or New Consults [phone]  Cell# to text for urgent issues Haris Gonzalez [phone]     HPI:  89 y/o M with PMHx vascular dementia, dysphagia, pancytopenia, HTN, GERD, constipation presenting to the ED from [facility] home with worsening agitation and confusion. Patient is minimally verbal at baseline. Pt was hospitalized in [place] for a UTI in August. Afterwards, family decided patient could not live alone, and moved him to [facility] here in [place] in late August. Shortly afterwards, patient was admitted to [facility] for a UTI. He was discharge back to the nursing home. However, patient was more agitated and confused. Rocephin was started in nursing home for suspected UTI, and he was transferred to [facility].       PAST MEDICAL & SURGICAL HISTORY:  Dementia      HTN (hypertension)      GERD (gastroesophageal reflux disease)      Dysphagia      HLD (hyperlipidemia)      No significant past surgical history          Antimicrobials  cefTRIAXone   IVPB 1000 milliGRAM(s) IV Intermittent every 24 hours      Immunological      Other  acetaminophen     Tablet .. 650 milliGRAM(s) Oral every 6 hours PRN  aluminum hydroxide/magnesium hydroxide/simethicone Suspension 30 milliLiter(s) Oral every 4 hours PRN  amLODIPine   Tablet 5 milliGRAM(s) Oral daily  ammonium lactate 12% Lotion 1 Application(s) Topical two times a day  brimonidine 0.2% Ophthalmic Solution 1 Drop(s) Both EYES two times a day  calcium carbonate   1250 mG (OsCal) 1 Tablet(s) Oral daily  cholecalciferol 2000 Unit(s) Oral daily  escitalopram 10 milliGRAM(s) Oral daily  hydrALAZINE 10 milliGRAM(s) Oral three times a day  hydrochlorothiazide 25 milliGRAM(s) Oral daily  latanoprost 0.005% Ophthalmic Solution 1 Drop(s) Both EYES at bedtime  losartan 100 milliGRAM(s) Oral daily  melatonin 3 milliGRAM(s) Oral at bedtime  OLANZapine Injectable 2.5 milliGRAM(s) IntraMuscular every 6 hours PRN  ondansetron Injectable 4 milliGRAM(s) IV Push every 8 hours PRN  QUEtiapine 50 milliGRAM(s) Oral at bedtime  QUEtiapine 25 milliGRAM(s) Oral two times a day  sodium chloride 0.9%. 1000 milliLiter(s) IV Continuous <Continuous>  traZODone 100 milliGRAM(s) Oral at bedtime      Allergies    No Known Allergies    Intolerances        SOCIAL HISTORY:  Social History:  Tobacco: former  Alcohol: denies  Recreational drugs: denies    Lives: at [facility]  ADLs: dependent  Ambulation: limited, with assistance (28 Sep 2023 20:32)      FAMILY HISTORY:  FH: coronary artery disease (Mother, Sibling)        ROS:    limited due to cognitive function    Vital Signs Last 24 Hrs  T(C): 38 (29 Sep 2023 06:31), Max: 38.1 (29 Sep 2023 04:35)  T(F): 100.4 (29 Sep 2023 06:31), Max: 100.6 (29 Sep 2023 04:35)  HR: 82 (29 Sep 2023 04:35) (58 - 82)  BP: 150/61 (29 Sep 2023 04:35) (100/54 - 150/61)  BP(mean): --  RR: 19 (29 Sep 2023 04:35) (16 - 19)  SpO2: 97% (29 Sep 2023 04:35) (93% - 100%)    Parameters below as of 29 Sep 2023 04:35  Patient On (Oxygen Delivery Method): room air        PE:  frail elderly male  HEENT:  NC, PERRL, sclerae anicteric, conjunctivae clear, EOMI.  Sinuses nontender, no nasal exudate.  No buccal or pharyngeal lesions, erythema or exudate  Neck:  Supple, no adenopathy  Lungs:  No accessory muscle use, bilaterally clear to auscultation  Cor:  distant  Abd:  Symmetric, normoactive BS.  Soft, nontender, no masses, guarding or rebound.  Liver and spleen not enlarged  Extrem:  No cyanosis or edema  Skin:  No rashes.  Neuro: limited        LABS:                        8.1    4.81  )-----------( 89       ( 29 Sep 2023 05:22 )             24.3       WBC Count: 4.81 K/uL (09-29-23 @ 05:22)  WBC Count: 5.01 K/uL (09-28-23 @ 17:05)      09-29    141  |  109<H>  |  22  ----------------------------<  66<L>  3.7   |  24  |  1.00    Ca    8.7      29 Sep 2023 05:22    TPro  6.1  /  Alb  2.4<L>  /  TBili  0.9  /  DBili  x   /  AST  23  /  ALT  36  /  AlkPhos  62  09-29      Creatinine: 1.00 mg/dL (09-29-23 @ 05:22)  Creatinine: 0.88 mg/dL (09-28-23 @ 17:05)      Urinalysis Basic - ( 29 Sep 2023 05:22 )    Color: x / Appearance: x / SG: x / pH: x  Gluc: 66 mg/dL / Ketone: x  / Bili: x / Urobili: x   Blood: x / Protein: x / Nitrite: x   Leuk Esterase: x / RBC: x / WBC x   Sq Epi: x / Non Sq Epi: x / Bacteria: x        MICROBIOLOGY:      RADIOLOGY & ADDITIONAL STUDIES:    --

## 2023-09-29 NOTE — DIETITIAN INITIAL EVALUATION ADULT - ORAL INTAKE PTA/DIET HISTORY
Pt admitted from [facility]. Reviewed transfer documents, pt was on a cardiac, puree consistency diet with mildly thick liquids PTA. Med Pass Fortified Nutritional Shake BID.

## 2023-09-29 NOTE — PROGRESS NOTE ADULT - SUBJECTIVE AND OBJECTIVE BOX
Patient is a 89y old  Male who presents with a chief complaint of Metabolic Encephalopathy (28 Sep 2023 20:32)      INTERVAL HPI/OVERNIGHT EVENTS: Patient seen and examined at bedside. Confused at baseline. Awake, No distress noted     MEDICATIONS  (STANDING):  amLODIPine   Tablet 5 milliGRAM(s) Oral daily  ammonium lactate 12% Lotion 1 Application(s) Topical two times a day  brimonidine 0.2% Ophthalmic Solution 1 Drop(s) Both EYES two times a day  calcium carbonate   1250 mG (OsCal) 1 Tablet(s) Oral daily  cefTRIAXone   IVPB 1000 milliGRAM(s) IV Intermittent every 24 hours  cholecalciferol 2000 Unit(s) Oral daily  escitalopram 10 milliGRAM(s) Oral daily  hydrALAZINE 10 milliGRAM(s) Oral three times a day  hydrochlorothiazide 25 milliGRAM(s) Oral daily  latanoprost 0.005% Ophthalmic Solution 1 Drop(s) Both EYES at bedtime  losartan 100 milliGRAM(s) Oral daily  melatonin 3 milliGRAM(s) Oral at bedtime  QUEtiapine 25 milliGRAM(s) Oral two times a day  sodium chloride 0.9%. 1000 milliLiter(s) (60 mL/Hr) IV Continuous <Continuous>  traZODone 100 milliGRAM(s) Oral at bedtime    MEDICATIONS  (PRN):  acetaminophen     Tablet .. 650 milliGRAM(s) Oral every 6 hours PRN Temp greater or equal to 38C (100.4F), Mild Pain (1 - 3)  aluminum hydroxide/magnesium hydroxide/simethicone Suspension 30 milliLiter(s) Oral every 4 hours PRN Dyspepsia  ondansetron Injectable 4 milliGRAM(s) IV Push every 8 hours PRN Nausea and/or Vomiting      Allergies    No Known Allergies    Intolerances        REVIEW OF SYSTEMS:  Unable to obtain due to mental status. Confused at baseline   Vital Signs Last 24 Hrs  T(C): 38 (29 Sep 2023 06:31), Max: 38.1 (29 Sep 2023 04:35)  T(F): 100.4 (29 Sep 2023 06:31), Max: 100.6 (29 Sep 2023 04:35)  HR: 82 (29 Sep 2023 04:35) (58 - 82)  BP: 150/61 (29 Sep 2023 04:35) (100/54 - 150/61)  BP(mean): --  RR: 19 (29 Sep 2023 04:35) (16 - 19)  SpO2: 97% (29 Sep 2023 04:35) (93% - 100%)    Parameters below as of 29 Sep 2023 04:35  Patient On (Oxygen Delivery Method): room air        PHYSICAL EXAM:  GENERAL: NAD, confused   HEAD:  Atraumatic, Normocephalic  EYES: EOMI, PERRLA, conjunctiva and sclera clear  ENMT: No tonsillar erythema, exudates, or enlargement; Moist mucous membranes  NECK: Supple, No JVD, Normal thyroid  CHEST/LUNG: Clear to auscultation bilaterally; No wheezing   HEART: S1S2+,  Regular rate and rhythm  ABDOMEN: Soft, Nontender, Nondistended; Bowel sounds present  EXTREMITIES:  2+ Peripheral Pulses, No clubbing, cyanosis  LYMPH: No lymphadenopathy noted  SKIN: No rashes, warm, dry     LABS:                        8.1    4.81  )-----------( 89       ( 29 Sep 2023 05:22 )             24.3     29 Sep 2023 05:22    141    |  109    |  22     ----------------------------<  66     3.7     |  24     |  1.00     Ca    8.7        29 Sep 2023 05:22    TPro  6.1    /  Alb  2.4    /  TBili  0.9    /  DBili  x      /  AST  23     /  ALT  36     /  AlkPhos  62     29 Sep 2023 05:22    PT/INR - ( 29 Sep 2023 05:22 )   PT: 12.6 sec;   INR: 1.08 ratio         PTT - ( 29 Sep 2023 05:22 )  PTT:24.9 sec  CAPILLARY BLOOD GLUCOSE        BLOOD CULTURE    RADIOLOGY & ADDITIONAL TESTS:    Imaging Personally Reviewed:  [ ] YES     Consultant(s) Notes Reviewed:      Care Discussed with Consultants/Other Providers: Patient is a 89y old  Male who presents with a chief complaint of Metabolic Encephalopathy (28 Sep 2023 20:32)      INTERVAL HPI/OVERNIGHT EVENTS: Patient seen and examined at bedside. Confused at baseline. Awake, No distress noted. + fever     MEDICATIONS  (STANDING):  amLODIPine   Tablet 5 milliGRAM(s) Oral daily  ammonium lactate 12% Lotion 1 Application(s) Topical two times a day  brimonidine 0.2% Ophthalmic Solution 1 Drop(s) Both EYES two times a day  calcium carbonate   1250 mG (OsCal) 1 Tablet(s) Oral daily  cefTRIAXone   IVPB 1000 milliGRAM(s) IV Intermittent every 24 hours  cholecalciferol 2000 Unit(s) Oral daily  escitalopram 10 milliGRAM(s) Oral daily  hydrALAZINE 10 milliGRAM(s) Oral three times a day  hydrochlorothiazide 25 milliGRAM(s) Oral daily  latanoprost 0.005% Ophthalmic Solution 1 Drop(s) Both EYES at bedtime  losartan 100 milliGRAM(s) Oral daily  melatonin 3 milliGRAM(s) Oral at bedtime  QUEtiapine 25 milliGRAM(s) Oral two times a day  sodium chloride 0.9%. 1000 milliLiter(s) (60 mL/Hr) IV Continuous <Continuous>  traZODone 100 milliGRAM(s) Oral at bedtime    MEDICATIONS  (PRN):  acetaminophen     Tablet .. 650 milliGRAM(s) Oral every 6 hours PRN Temp greater or equal to 38C (100.4F), Mild Pain (1 - 3)  aluminum hydroxide/magnesium hydroxide/simethicone Suspension 30 milliLiter(s) Oral every 4 hours PRN Dyspepsia  ondansetron Injectable 4 milliGRAM(s) IV Push every 8 hours PRN Nausea and/or Vomiting      Allergies    No Known Allergies    Intolerances        REVIEW OF SYSTEMS:  Unable to obtain due to mental status. Confused at baseline   Vital Signs Last 24 Hrs  T(C): 38 (29 Sep 2023 06:31), Max: 38.1 (29 Sep 2023 04:35)  T(F): 100.4 (29 Sep 2023 06:31), Max: 100.6 (29 Sep 2023 04:35)  HR: 82 (29 Sep 2023 04:35) (58 - 82)  BP: 150/61 (29 Sep 2023 04:35) (100/54 - 150/61)  BP(mean): --  RR: 19 (29 Sep 2023 04:35) (16 - 19)  SpO2: 97% (29 Sep 2023 04:35) (93% - 100%)    Parameters below as of 29 Sep 2023 04:35  Patient On (Oxygen Delivery Method): room air        PHYSICAL EXAM:  GENERAL: NAD, confused   HEAD:  Atraumatic, Normocephalic  EYES: EOMI, PERRLA, conjunctiva and sclera clear  ENMT: No tonsillar erythema, exudates, or enlargement; Moist mucous membranes  NECK: Supple, No JVD, Normal thyroid  CHEST/LUNG: Clear to auscultation bilaterally; No wheezing   HEART: S1S2+,  Regular rate and rhythm  ABDOMEN: Soft, Nontender, Nondistended; Bowel sounds present  EXTREMITIES:  2+ Peripheral Pulses, No clubbing, cyanosis  LYMPH: No lymphadenopathy noted  SKIN: No rashes, warm, dry     LABS:                        8.1    4.81  )-----------( 89       ( 29 Sep 2023 05:22 )             24.3     29 Sep 2023 05:22    141    |  109    |  22     ----------------------------<  66     3.7     |  24     |  1.00     Ca    8.7        29 Sep 2023 05:22    TPro  6.1    /  Alb  2.4    /  TBili  0.9    /  DBili  x      /  AST  23     /  ALT  36     /  AlkPhos  62     29 Sep 2023 05:22    PT/INR - ( 29 Sep 2023 05:22 )   PT: 12.6 sec;   INR: 1.08 ratio         PTT - ( 29 Sep 2023 05:22 )  PTT:24.9 sec  CAPILLARY BLOOD GLUCOSE        BLOOD CULTURE    RADIOLOGY & ADDITIONAL TESTS:    Imaging Personally Reviewed:  [ ] YES     Consultant(s) Notes Reviewed:      Care Discussed with Consultants/Other Providers:

## 2023-09-29 NOTE — CARE COORDINATION ASSESSMENT. - OTHER PERTINENT DISCHARGE PLANNING INFORMATION:
Pt is a resident for LTC at [facility], SW spoke with kavon Loera who states plan is for pt to return to [facility] upon DC.

## 2023-09-29 NOTE — DIETITIAN INITIAL EVALUATION ADULT - NS FNS DIET ORDER
Diet, Pureed:   DASH/TLC {Sodium & Cholesterol Restricted}  Mildly Thick Liquids (MILDTHICKLIQS) (09-28-23 @ 21:31)

## 2023-09-29 NOTE — CONSULT NOTE ADULT - ASSESSMENT
[facility] Infectious Diseases  Chart Reviewed-Full Consult to follow for any immediate concerns please fell free to contact us directly at  [phone] and have us paged or text my cell # [phone]  Haris Gonzalez MD PhD   89 y/o M with PMHx vascular dementia, dysphagia, pancytopenia, HTN, GERD, constipation presenting to the ED from [facility] with worsening agitation and confusion.    RECOMMENDATIONS    1-Altered Mental Status at this point of note patient is afebrile, no leukocytosis, clear pulm exam imaging. Urine not collected for urinalysis or culture and antibiotics already started. Pt not able to give any reliable reports of symptoms so challenging in terms of diagnosis. With reports of recent UTIs reasonable to treat with Ceftriaxone (started 9/28)  have asked RN to send urine for analysis and culture  recommend supportive care, hydration and recs to follow as picture is clarified.    Thank you for consulting us and involving us in the management of this most interesting and challenging case.  We will follow along in the care of this patient. Please call us at [phone] or text me directly on my cell# at [phone] with any concerns.

## 2023-09-29 NOTE — CARE COORDINATION ASSESSMENT. - SOCIAL WORK INTERVENTIONS
Pt to return to LTC at [facility]/coordination of care/referral to Sub-Acute Rehabilitation Facility

## 2023-09-29 NOTE — DIETITIAN INITIAL EVALUATION ADULT - CALCULATED FROM (ML/KG)
Patient with a contrast allergy. Order entered for predinsone, 50 mg, 3 tabs only and sheet provided to patient with dates/times when to take predisone and benadryl prior to CT on 09/21. Script sent to [address] [facility] in [place]. Patient verbalized understanding. [id number]

## 2023-09-29 NOTE — PROGRESS NOTE ADULT - PROBLEM SELECTOR PLAN 3
Chronic, progressive. Worsening agitation for several months, minimally verbal, cachectic   - Cont Zyprexa, Trazadone, Lexapro  - Palliative eval  -DNR/DNI H/o dysphagia. Unknown etiology  - Diet in nursing home: pureed w/ thicken liquids   - Nutrition eval

## 2023-09-29 NOTE — DIETITIAN INITIAL EVALUATION ADULT - PROBLEM SELECTOR PLAN 2
Chronic. Unknown baselines  - Hgb on admission 9, no recent bleeding per family  - If hgb downtrends further, consider FOBT + anemia workup  - Check iron studies   - Plt 91 on admission, unknown etiology. Patient receives Lovenox 30mg qd at nursing home  - Hold chemical DVT ppx   - Heme/Onc consult only if family agreeable

## 2023-09-29 NOTE — CAREGIVER ENGAGEMENT NOTE - CAREGIVER OUTREACH NOTES - FREE TEXT
RUTH spoke with pt's niece who states pt is a LTC resident at [facility], plan is return upon DC.

## 2023-09-29 NOTE — PROGRESS NOTE ADULT - PROBLEM SELECTOR PLAN 4
H/o dysphagia. Unknown etiology  - Diet in nursing home: pureed w/ thicken liquids   - Nutrition eval Chronic  - Cont Hydralazine, Losartan, HCTZ, Amlodipine

## 2023-09-29 NOTE — DIETITIAN INITIAL EVALUATION ADULT - PERTINENT LABORATORY DATA
09-29    141  |  109<H>  |  22  ----------------------------<  66<L>  3.7   |  24  |  1.00    Ca    8.7      29 Sep 2023 05:22    TPro  6.1  /  Alb  2.4<L>  /  TBili  0.9  /  DBili  x   /  AST  23  /  ALT  36  /  AlkPhos  62  09-29

## 2023-09-29 NOTE — DIETITIAN INITIAL EVALUATION ADULT - PERTINENT MEDS FT
MEDICATIONS  (STANDING):  amLODIPine   Tablet 5 milliGRAM(s) Oral daily  ammonium lactate 12% Lotion 1 Application(s) Topical two times a day  brimonidine 0.2% Ophthalmic Solution 1 Drop(s) Both EYES two times a day  calcium carbonate   1250 mG (OsCal) 1 Tablet(s) Oral daily  cefTRIAXone   IVPB 1000 milliGRAM(s) IV Intermittent every 24 hours  cholecalciferol 2000 Unit(s) Oral daily  escitalopram 10 milliGRAM(s) Oral daily  hydrALAZINE 10 milliGRAM(s) Oral three times a day  hydrochlorothiazide 25 milliGRAM(s) Oral daily  latanoprost 0.005% Ophthalmic Solution 1 Drop(s) Both EYES at bedtime  losartan 100 milliGRAM(s) Oral daily  melatonin 3 milliGRAM(s) Oral at bedtime  QUEtiapine 50 milliGRAM(s) Oral at bedtime  QUEtiapine 25 milliGRAM(s) Oral two times a day  sodium chloride 0.9%. 1000 milliLiter(s) (60 mL/Hr) IV Continuous <Continuous>  traZODone 100 milliGRAM(s) Oral at bedtime    MEDICATIONS  (PRN):  acetaminophen     Tablet .. 650 milliGRAM(s) Oral every 6 hours PRN Temp greater or equal to 38C (100.4F), Mild Pain (1 - 3)  aluminum hydroxide/magnesium hydroxide/simethicone Suspension 30 milliLiter(s) Oral every 4 hours PRN Dyspepsia  OLANZapine Injectable 2.5 milliGRAM(s) IntraMuscular every 6 hours PRN Agitation  ondansetron Injectable 4 milliGRAM(s) IV Push every 8 hours PRN Nausea and/or Vomiting

## 2023-09-29 NOTE — DIETITIAN INITIAL EVALUATION ADULT - NS FNS ENTERAL CURRENT ORDER
Pt resting quietly in mothers arms with nad. No needs voiced at this time.    Current diet order meets estimated nutrient requirements

## 2023-09-29 NOTE — PROGRESS NOTE ADULT - PROBLEM SELECTOR PLAN 1
Recurrent UTIs over the past 2 months, each requiring hospitalization. Now p/w encephalopathy likely metabolic 2/2 UTI  - Hypothermic, other VSS. Lactate wnl, normal WBC  - s/p Rocephin in nursing home, and Zosyn in ED. Continue with Rocephin  - f/u blood and urine cultures, and UA. Urine will be collected after initiation of abx  - Continue warming blanket and bear-hugger. Tylenol prn fevers  - IVF x 12 hours   - ID (Dr. Gonzalez) consult Recurrent UTIs over the past 2 months, each requiring hospitalization. Now p/w encephalopathy likely metabolic 2/2 UTI  - Hypothermic, other VSS. Lactate wnl, normal WBC  - s/p Rocephin in nursing home, and Zosyn in ED. Continue with Rocephin  - f/u blood and urine cultures, and UA. Urine will be collected after initiation of abx  - Continue warming blanket and bear-hugger. Tylenol prn fevers  - IVF x 12 hours   - ID (Dr. Gonzalez) consult  -Monitor h/h, counts Recurrent UTIs over the past 2 months, each requiring hospitalization. Now p/w encephalopathy likely metabolic 2/2 UTI  - + Fever, Tmax 100.6  -Tylenol PRN fever   - s/p Rocephin in nursing home, and Zosyn in ED. Continue with Rocephin  - f/u blood and urine cultures, and UA. Urine will be collected after initiation of abx  - Continue warming blanket and bear-hugger. Tylenol prn fevers  - IVF x 12 hours   - ID (Dr. Gonzalez) consult  -Monitor h/h, counts

## 2023-09-29 NOTE — CARE COORDINATION ASSESSMENT. - NSCAREPROVIDERS_GEN_ALL_CORE_FT
CARE PROVIDERS:  Accepting Physician: Saul Mitchell  Administration: Peter Dumont  Admitting: Saul Mitchell  Attending: Saul Mitchell  Case Management: Rosa Benoit  Case Management: Gee Lyon  Case Management: Dacia Bella  Consultant: Nalini Berg  Consultant: Haris Gonzalez  Covering Team: Mehul Gramajo ED Attending: Aura Baumann ED Nurse: Mary Jane Taylor  Nurse: Goldie Machuca  Nurse: Sondra Bailey  Nurse: Xena Cross  Ordered: ADM, User  Override: Olamide Jason  PCA/Nursing Assistant: Irena Flores  PCA/Nursing Assistant: Ayanna Inman  Physical Therapy: Chaparro Chandler  Primary Team: Balta Rockwell  Primary Team: Lennie Morales  Primary Team: Dong Busch  Registered Dietitian: Jacque Nam  Respiratory Therapy: Yenny Yan  : Yazmin Mckeon  : Brit Nielson  : Mariam Urbano  Team: [facility], Team

## 2023-09-29 NOTE — DIETITIAN INITIAL EVALUATION ADULT - OTHER INFO
Pt is a "89 y/o M with PMHx vascular dementia, dysphagia, pancytopenia, HTN, GERD, constipation presenting to the ED from [facility] with worsening agitation and confusion. Admitted for suspected UTI and metabolic encephalopathy."    Visited pt at bedside this am. Pt alert/confused during visit. Minimally verbal. Nutrition-related hx obtained from transfer papers. Pt currently on puree, DASH/TLC diet with mildly thick liquids. NKFA. No N/V/D/C per chart review. No BMs thus far. CBW on admission 137#. No ht documented in EMR, ht of 5'9" noted in transfer documents. No edema noted. Skin ecchymotic. Recommend ensure plus HP BID for additional kcal/protein, and magic cup fortified ice cream 4oz cup daily. Recommend continued assistance/encouragement with meals and oral nutritional supplements at meal times.

## 2023-09-30 PROCEDURE — 99233 SBSQ HOSP IP/OBS HIGH 50: CPT

## 2023-09-30 RX ORDER — SODIUM CHLORIDE 9 MG/ML
1000 INJECTION INTRAMUSCULAR; INTRAVENOUS; SUBCUTANEOUS ONCE
Refills: 0 | Status: COMPLETED | OUTPATIENT
Start: 2023-09-30 | End: 2023-09-30

## 2023-09-30 RX ORDER — HYDRALAZINE HCL 50 MG
25 TABLET ORAL EVERY 8 HOURS
Refills: 0 | Status: DISCONTINUED | OUTPATIENT
Start: 2023-09-30 | End: 2023-10-01

## 2023-09-30 RX ADMIN — Medication 100 MILLIGRAM(S): at 21:18

## 2023-09-30 RX ADMIN — BRIMONIDINE TARTRATE 1 DROP(S): 2 SOLUTION/ DROPS OPHTHALMIC at 17:43

## 2023-09-30 RX ADMIN — BRIMONIDINE TARTRATE 1 DROP(S): 2 SOLUTION/ DROPS OPHTHALMIC at 05:21

## 2023-09-30 RX ADMIN — Medication 2000 UNIT(S): at 11:43

## 2023-09-30 RX ADMIN — AMLODIPINE BESYLATE 5 MILLIGRAM(S): 2.5 TABLET ORAL at 05:22

## 2023-09-30 RX ADMIN — QUETIAPINE FUMARATE 25 MILLIGRAM(S): 200 TABLET, FILM COATED ORAL at 05:22

## 2023-09-30 RX ADMIN — QUETIAPINE FUMARATE 25 MILLIGRAM(S): 200 TABLET, FILM COATED ORAL at 17:43

## 2023-09-30 RX ADMIN — Medication 1 TABLET(S): at 11:43

## 2023-09-30 RX ADMIN — Medication 1 APPLICATION(S): at 17:43

## 2023-09-30 RX ADMIN — Medication 1 APPLICATION(S): at 05:23

## 2023-09-30 RX ADMIN — QUETIAPINE FUMARATE 50 MILLIGRAM(S): 200 TABLET, FILM COATED ORAL at 21:20

## 2023-09-30 RX ADMIN — SODIUM CHLORIDE 1000 MILLILITER(S): 9 INJECTION INTRAMUSCULAR; INTRAVENOUS; SUBCUTANEOUS at 22:10

## 2023-09-30 RX ADMIN — LATANOPROST 1 DROP(S): 0.05 SOLUTION/ DROPS OPHTHALMIC; TOPICAL at 21:18

## 2023-09-30 RX ADMIN — Medication 10 MILLIGRAM(S): at 05:21

## 2023-09-30 RX ADMIN — CEFTRIAXONE 100 MILLIGRAM(S): 500 INJECTION, POWDER, FOR SOLUTION INTRAMUSCULAR; INTRAVENOUS at 11:42

## 2023-09-30 RX ADMIN — Medication 3 MILLIGRAM(S): at 21:18

## 2023-09-30 RX ADMIN — ESCITALOPRAM OXALATE 10 MILLIGRAM(S): 10 TABLET, FILM COATED ORAL at 11:43

## 2023-09-30 RX ADMIN — LOSARTAN POTASSIUM 100 MILLIGRAM(S): 100 TABLET, FILM COATED ORAL at 05:22

## 2023-09-30 NOTE — PROGRESS NOTE ADULT - SUBJECTIVE AND OBJECTIVE BOX
Patient is a 89y old  Male who presents with a chief complaint of Hypothermia, initial encounter      INTERVAL HPI/OVERNIGHT EVENTS: Patient seen and examined at bedside. Awake, alert. Non verbal, confused at baseline     MEDICATIONS  (STANDING):  amLODIPine   Tablet 5 milliGRAM(s) Oral daily  ammonium lactate 12% Lotion 1 Application(s) Topical two times a day  brimonidine 0.2% Ophthalmic Solution 1 Drop(s) Both EYES two times a day  calcium carbonate   1250 mG (OsCal) 1 Tablet(s) Oral daily  cefTRIAXone   IVPB 1000 milliGRAM(s) IV Intermittent every 24 hours  cholecalciferol 2000 Unit(s) Oral daily  escitalopram 10 milliGRAM(s) Oral daily  hydrALAZINE 25 milliGRAM(s) Oral every 8 hours  hydrochlorothiazide 25 milliGRAM(s) Oral daily  latanoprost 0.005% Ophthalmic Solution 1 Drop(s) Both EYES at bedtime  losartan 100 milliGRAM(s) Oral daily  melatonin 3 milliGRAM(s) Oral at bedtime  QUEtiapine 50 milliGRAM(s) Oral at bedtime  QUEtiapine 25 milliGRAM(s) Oral two times a day  sodium chloride 0.9%. 1000 milliLiter(s) (60 mL/Hr) IV Continuous <Continuous>  traZODone 100 milliGRAM(s) Oral at bedtime    MEDICATIONS  (PRN):  acetaminophen     Tablet .. 650 milliGRAM(s) Oral every 6 hours PRN Temp greater or equal to 38C (100.4F), Mild Pain (1 - 3)  aluminum hydroxide/magnesium hydroxide/simethicone Suspension 30 milliLiter(s) Oral every 4 hours PRN Dyspepsia  OLANZapine Injectable 2.5 milliGRAM(s) IntraMuscular every 6 hours PRN Agitation  ondansetron Injectable 4 milliGRAM(s) IV Push every 8 hours PRN Nausea and/or Vomiting      Allergies    No Known Allergies    Intolerances        REVIEW OF SYSTEMS:  Unable to obtain due to confusion   Vital Signs Last 24 Hrs  T(C): 36.2 (30 Sep 2023 04:55), Max: 36.6 (29 Sep 2023 19:59)  T(F): 97.2 (30 Sep 2023 04:55), Max: 97.8 (29 Sep 2023 19:59)  HR: 64 (30 Sep 2023 04:55) (64 - 81)  BP: 182/95 (30 Sep 2023 04:55) (108/61 - 182/95)  BP(mean): --  RR: 19 (30 Sep 2023 04:55) (17 - 19)  SpO2: 96% (30 Sep 2023 04:55) (96% - 97%)    Parameters below as of 30 Sep 2023 04:55  Patient On (Oxygen Delivery Method): room air        PHYSICAL EXAM:  GENERAL: NAD, confused  HEAD:  Atraumatic, Normocephalic  EYES: EOMI, PERRLA, conjunctiva and sclera clear  ENMT: No tonsillar erythema, exudates, or enlargement; Moist mucous membranes  NECK: Supple, No JVD, Normal thyroid  CHEST/LUNG: Clear to auscultation bilaterally; No wheezing   HEART: S1S2+,  Regular rate and rhythm  ABDOMEN: Soft, Nontender, Nondistended; Bowel sounds present  EXTREMITIES:  2+ Peripheral Pulses, No clubbing, cyanosis  LYMPH: No lymphadenopathy noted  SKIN: No rashes, warm, dry   LABS:      Ca    8.7        29 Sep 2023 05:22      PT/INR - ( 29 Sep 2023 05:22 )   PT: 12.6 sec;   INR: 1.08 ratio         PTT - ( 29 Sep 2023 05:22 )  PTT:24.9 sec  CAPILLARY BLOOD GLUCOSE        BLOOD CULTURE  09-28 @ 17:05   No growth at 24 hours  --  --    RADIOLOGY & ADDITIONAL TESTS:    Imaging Personally Reviewed:  [ ] YES     Consultant(s) Notes Reviewed:      Care Discussed with Consultants/Other Providers:

## 2023-09-30 NOTE — PROGRESS NOTE ADULT - SUBJECTIVE AND OBJECTIVE BOX
[facility] DIVISION of INFECTIOUS DISEASE  Haris Gonzalez MD PhD, Clara Lau MD, Robyn Lino MD, Celia Davenport MD, Brad Shields MD  and providing coverage with Nahum Garcia MD  Providing Infectious Disease Consultations at [facility], [facility], [facility]'s    Office# [phone] to schedule follow up appointments  Answering Service for urgent calls or New Consults [phone]  Cell# to text for urgent issues Haris Gonzalez [phone]     infectious diseases progress note:    CELESTINE NUR is a 89y y. o. Male patient    Overnight and events of the last 24hrs reviewed    Allergies    No Known Allergies    Intolerances        ANTIBIOTICS/RELEVANT:  antimicrobials  cefTRIAXone   IVPB 1000 milliGRAM(s) IV Intermittent every 24 hours    immunologic:    OTHER:  acetaminophen     Tablet .. 650 milliGRAM(s) Oral every 6 hours PRN  aluminum hydroxide/magnesium hydroxide/simethicone Suspension 30 milliLiter(s) Oral every 4 hours PRN  amLODIPine   Tablet 5 milliGRAM(s) Oral daily  ammonium lactate 12% Lotion 1 Application(s) Topical two times a day  brimonidine 0.2% Ophthalmic Solution 1 Drop(s) Both EYES two times a day  calcium carbonate   1250 mG (OsCal) 1 Tablet(s) Oral daily  cholecalciferol 2000 Unit(s) Oral daily  escitalopram 10 milliGRAM(s) Oral daily  hydrALAZINE 25 milliGRAM(s) Oral every 8 hours  hydrochlorothiazide 25 milliGRAM(s) Oral daily  latanoprost 0.005% Ophthalmic Solution 1 Drop(s) Both EYES at bedtime  losartan 100 milliGRAM(s) Oral daily  melatonin 3 milliGRAM(s) Oral at bedtime  OLANZapine Injectable 2.5 milliGRAM(s) IntraMuscular every 6 hours PRN  ondansetron Injectable 4 milliGRAM(s) IV Push every 8 hours PRN  QUEtiapine 25 milliGRAM(s) Oral two times a day  QUEtiapine 50 milliGRAM(s) Oral at bedtime  sodium chloride 0.9%. 1000 milliLiter(s) IV Continuous <Continuous>  traZODone 100 milliGRAM(s) Oral at bedtime      Objective:  Vital Signs Last 24 Hrs  T(C): 36.2 (30 Sep 2023 04:55), Max: 36.6 (29 Sep 2023 19:59)  T(F): 97.2 (30 Sep 2023 04:55), Max: 97.8 (29 Sep 2023 19:59)  HR: 64 (30 Sep 2023 04:55) (64 - 81)  BP: 182/95 (30 Sep 2023 04:55) (108/61 - 182/95)  BP(mean): --  RR: 19 (30 Sep 2023 04:55) (17 - 19)  SpO2: 96% (30 Sep 2023 04:55) (96% - 97%)    Parameters below as of 30 Sep 2023 04:55  Patient On (Oxygen Delivery Method): room air        T(C): 36.2 (23 @ 04:55), Max: 38.1 (23 @ 04:35)  T(C): 36.2 (23 @ 04:55), Max: 38.1 (23 @ 04:35)  T(C): 36.2 (23 @ 04:55), Max: 38.1 (23 @ 04:35)    PHYSICAL EXAM:  HEENT: NC atraumatic  Neck: supple  Respiratory: no accessory muscle use, breathing comfortably  Cardiovascular: distant  Gastrointestinal: normal appearing, nondistended  Extremities: no clubbing, no cyanosis,        LABS:                          8.1    4.81  )-----------( 89       ( 29 Sep 2023 05:22 )             24.3       WBC  4.81  @ 05:22  5.01  @ 17:05          141  |  109<H>  |  22  ----------------------------<  66<L>  3.7   |  24  |  1.00    Ca    8.7      29 Sep 2023 05:22    TPro  6.1  /  Alb  2.4<L>  /  TBili  0.9  /  DBili  x   /  AST  23  /  ALT  36  /  AlkPhos  62        Creatinine: 1.00 mg/dL (23 @ 05:22)  Creatinine: 0.88 mg/dL (23 @ 17:05)      PT/INR - ( 29 Sep 2023 05:22 )   PT: 12.6 sec;   INR: 1.08 ratio         PTT - ( 29 Sep 2023 05:22 )  PTT:24.9 sec  Urinalysis Basic - ( 29 Sep 2023 11:09 )    Color: Yellow / Appearance: Clear / S.019 / pH: x  Gluc: x / Ketone: Trace mg/dL  / Bili: Negative / Urobili: 2.0 mg/dL   Blood: x / Protein: Negative mg/dL / Nitrite: Negative   Leuk Esterase: Small / RBC: Too Numerous to count /HPF / WBC 1 /HPF   Sq Epi: x / Non Sq Epi: x / Bacteria: Few /HPF            INFLAMMATORY MARKERS      MICROBIOLOGY:              RADIOLOGY & ADDITIONAL STUDIES:

## 2023-09-30 NOTE — PROGRESS NOTE ADULT - PROBLEM SELECTOR PLAN 1
Recurrent UTIs over the past 2 months, each requiring hospitalization. Now p/w encephalopathy likely metabolic 2/2 UTI  -RVP Negative   - afebrile today   -Tylenol PRN fever   - Continue with Rocephin  - BC NGTD. UC Pending.   - Continue warming blanket and bear-hugger. Tylenol prn fevers  - IVF x 12 hours   - ID (Dr. Gonzalez) consult  -Monitor h/h, counts

## 2023-09-30 NOTE — PROVIDER CONTACT NOTE (OTHER) - ASSESSMENT
pt confused,no s/s noted.vss.
Pt awake, confused at baseline. In no apparent physical distress  VS: T 98.3 HR 78 BP 80/50 RR 16 SpO2 95% RA

## 2023-09-30 NOTE — PROGRESS NOTE ADULT - PROBLEM SELECTOR PLAN 2
Chronic, progressive. Worsening agitation for several months, minimally verbal, cachectic   - Cont Zyprexa, Trazadone, Lexapro  - Palliative eval  -DNR/DNI

## 2023-09-30 NOTE — SOCIAL WORK PROGRESS NOTE - NSSWPROGRESSNOTE_GEN_ALL_CORE
RUTH consulted- "From [facility]." RUTH has spoken with niece who confirmed pt will return back to [facility] when medically stable for dc.

## 2023-09-30 NOTE — PROGRESS NOTE ADULT - PROBLEM SELECTOR PLAN 4
Chronic  - Cont Hydralazine, Losartan, HCTZ, Amlodipine Chronic  - Cont Hydralazine, Losartan, HCTZ, Amlodipine  -Hydralazine dose increased due to elevated BP, with hold parameters no concerns

## 2023-10-01 LAB
ANION GAP SERPL CALC-SCNC: 6 MMOL/L — SIGNIFICANT CHANGE UP (ref 5–17)
BUN SERPL-MCNC: 16 MG/DL — SIGNIFICANT CHANGE UP (ref 7–23)
CALCIUM SERPL-MCNC: 8.4 MG/DL — LOW (ref 8.5–10.1)
CHLORIDE SERPL-SCNC: 109 MMOL/L — HIGH (ref 96–108)
CO2 SERPL-SCNC: 26 MMOL/L — SIGNIFICANT CHANGE UP (ref 22–31)
CREAT SERPL-MCNC: 0.93 MG/DL — SIGNIFICANT CHANGE UP (ref 0.5–1.3)
CULTURE RESULTS: SIGNIFICANT CHANGE UP
EGFR: 75 ML/MIN/1.73M2 — SIGNIFICANT CHANGE UP
GLUCOSE SERPL-MCNC: 93 MG/DL — SIGNIFICANT CHANGE UP (ref 70–99)
HCT VFR BLD CALC: 24.4 % — LOW (ref 39–50)
HGB BLD-MCNC: 8 G/DL — LOW (ref 13–17)
MCHC RBC-ENTMCNC: 29.4 PG — SIGNIFICANT CHANGE UP (ref 27–34)
MCHC RBC-ENTMCNC: 32.8 GM/DL — SIGNIFICANT CHANGE UP (ref 32–36)
MCV RBC AUTO: 89.7 FL — SIGNIFICANT CHANGE UP (ref 80–100)
NRBC # BLD: 0 /100 WBCS — SIGNIFICANT CHANGE UP (ref 0–0)
PLATELET # BLD AUTO: 81 K/UL — LOW (ref 150–400)
POTASSIUM SERPL-MCNC: 3.5 MMOL/L — SIGNIFICANT CHANGE UP (ref 3.5–5.3)
POTASSIUM SERPL-SCNC: 3.5 MMOL/L — SIGNIFICANT CHANGE UP (ref 3.5–5.3)
RBC # BLD: 2.72 M/UL — LOW (ref 4.2–5.8)
RBC # FLD: 14 % — SIGNIFICANT CHANGE UP (ref 10.3–14.5)
SODIUM SERPL-SCNC: 141 MMOL/L — SIGNIFICANT CHANGE UP (ref 135–145)
SPECIMEN SOURCE: SIGNIFICANT CHANGE UP
WBC # BLD: 4.03 K/UL — SIGNIFICANT CHANGE UP (ref 3.8–10.5)
WBC # FLD AUTO: 4.03 K/UL — SIGNIFICANT CHANGE UP (ref 3.8–10.5)

## 2023-10-01 PROCEDURE — 99233 SBSQ HOSP IP/OBS HIGH 50: CPT

## 2023-10-01 RX ORDER — TRAZODONE HCL 50 MG
1 TABLET ORAL
Refills: 0 | DISCHARGE

## 2023-10-01 RX ORDER — HYDRALAZINE HCL 50 MG
10 TABLET ORAL EVERY 6 HOURS
Refills: 0 | Status: DISCONTINUED | OUTPATIENT
Start: 2023-10-01 | End: 2023-10-01

## 2023-10-01 RX ORDER — HYDRALAZINE HCL 50 MG
10 TABLET ORAL ONCE
Refills: 0 | Status: DISCONTINUED | OUTPATIENT
Start: 2023-10-01 | End: 2023-10-05

## 2023-10-01 RX ORDER — ENOXAPARIN SODIUM 100 MG/ML
30 INJECTION SUBCUTANEOUS
Refills: 0 | Status: DISCONTINUED | OUTPATIENT
Start: 2023-10-01 | End: 2023-10-01

## 2023-10-01 RX ADMIN — CEFTRIAXONE 100 MILLIGRAM(S): 500 INJECTION, POWDER, FOR SOLUTION INTRAMUSCULAR; INTRAVENOUS at 11:27

## 2023-10-01 RX ADMIN — BRIMONIDINE TARTRATE 1 DROP(S): 2 SOLUTION/ DROPS OPHTHALMIC at 05:41

## 2023-10-01 RX ADMIN — BRIMONIDINE TARTRATE 1 DROP(S): 2 SOLUTION/ DROPS OPHTHALMIC at 18:28

## 2023-10-01 RX ADMIN — Medication 1 APPLICATION(S): at 05:42

## 2023-10-01 RX ADMIN — QUETIAPINE FUMARATE 25 MILLIGRAM(S): 200 TABLET, FILM COATED ORAL at 05:42

## 2023-10-01 RX ADMIN — Medication 1 APPLICATION(S): at 18:27

## 2023-10-01 RX ADMIN — AMLODIPINE BESYLATE 5 MILLIGRAM(S): 2.5 TABLET ORAL at 05:42

## 2023-10-01 NOTE — PROGRESS NOTE ADULT - PROBLEM SELECTOR PLAN 1
Recurrent UTIs over the past 2 months, each requiring hospitalization. Admitted for  encephalopathy likely metabolic 2/2 UTI, hypothermia   -RVP Negative   - afebrile today   -Tylenol PRN fever   - Continue with Rocephin  - BC NGTD. UC Pending.   - Continue warming blanket and bear-hugger. Tylenol prn fevers  - s/p IVF x 12 hours. Oral intake is better per RN   - ID (Dr. Gonzalez) consult  -Monitor h/h, counts

## 2023-10-01 NOTE — PROGRESS NOTE ADULT - SUBJECTIVE AND OBJECTIVE BOX
[facility] DIVISION of INFECTIOUS DISEASE  Haris Gonzalez MD PhD, Clara Lau MD, Robyn Lino MD, Celia Davenport MD, Brad Shields MD  and providing coverage with Nahum Garcia MD  Providing Infectious Disease Consultations at [facility], [facility], [facility]'s    Office# [phone] to schedule follow up appointments  Answering Service for urgent calls or New Consults [phone]  Cell# to text for urgent issues Haris Gonzalez [phone]     infectious diseases progress note:    CELESTINE NUR is a 89y y. o. Male patient    Overnight and events of the last 24hrs reviewed    Allergies    No Known Allergies    Intolerances        ANTIBIOTICS/RELEVANT:  antimicrobials  cefTRIAXone   IVPB 1000 milliGRAM(s) IV Intermittent every 24 hours    immunologic:    OTHER:  acetaminophen     Tablet .. 650 milliGRAM(s) Oral every 6 hours PRN  aluminum hydroxide/magnesium hydroxide/simethicone Suspension 30 milliLiter(s) Oral every 4 hours PRN  amLODIPine   Tablet 5 milliGRAM(s) Oral daily  ammonium lactate 12% Lotion 1 Application(s) Topical two times a day  brimonidine 0.2% Ophthalmic Solution 1 Drop(s) Both EYES two times a day  calcium carbonate   1250 mG (OsCal) 1 Tablet(s) Oral daily  cholecalciferol 2000 Unit(s) Oral daily  escitalopram 10 milliGRAM(s) Oral daily  hydrochlorothiazide 25 milliGRAM(s) Oral daily  latanoprost 0.005% Ophthalmic Solution 1 Drop(s) Both EYES at bedtime  losartan 100 milliGRAM(s) Oral daily  melatonin 3 milliGRAM(s) Oral at bedtime  OLANZapine Injectable 2.5 milliGRAM(s) IntraMuscular every 6 hours PRN  ondansetron Injectable 4 milliGRAM(s) IV Push every 8 hours PRN  QUEtiapine 25 milliGRAM(s) Oral two times a day  QUEtiapine 50 milliGRAM(s) Oral at bedtime  sodium chloride 0.9%. 1000 milliLiter(s) IV Continuous <Continuous>  traZODone 100 milliGRAM(s) Oral at bedtime      Objective:  Vital Signs Last 24 Hrs  T(C): 36.4 (01 Oct 2023 08:11), Max: 36.9 (30 Sep 2023 23:22)  T(F): 97.5 (01 Oct 2023 08:11), Max: 98.5 (30 Sep 2023 23:22)  HR: 70 (01 Oct 2023 08:11) (56 - 86)  BP: 158/85 (01 Oct 2023 08:11) (80/50 - 158/85)  BP(mean): --  RR: 18 (01 Oct 2023 08:11) (16 - 18)  SpO2: 94% (01 Oct 2023 08:11) (92% - 95%)    Parameters below as of 01 Oct 2023 08:11  Patient On (Oxygen Delivery Method): room air  O2 Flow (L/min): 4      T(C): 36.4 (10-01-23 @ 08:11), Max: 36.9 (09-30-23 @ 23:22)  T(C): 36.4 (10-01-23 @ 08:11), Max: 38.1 (09-29-23 @ 04:35)  T(C): 36.4 (10-01-23 @ 08:11), Max: 38.1 (09-29-23 @ 04:35)    PHYSICAL EXAM:  HEENT: NC atraumatic  Neck: supple  Respiratory: no accessory muscle use, breathing comfortably  Cardiovascular: distant  Gastrointestinal: normal appearing, nondistended  Extremities: no clubbing, no cyanosis,        LABS:                          8.0    4.03  )-----------( 81       ( 01 Oct 2023 07:25 )             24.4       WBC  4.03 10-01 @ 07:25  4.81 09-29 @ 05:22  5.01 09-28 @ 17:05      10-01    141  |  109<H>  |  16  ----------------------------<  93  3.5   |  26  |  0.93    Ca    8.4<L>      01 Oct 2023 07:25        Creatinine: 0.93 mg/dL (10-01-23 @ 07:25)  Creatinine: 1.00 mg/dL (09-29-23 @ 05:22)  Creatinine: 0.88 mg/dL (09-28-23 @ 17:05)        Urinalysis Basic - ( 01 Oct 2023 07:25 )    Color: x / Appearance: x / SG: x / pH: x  Gluc: 93 mg/dL / Ketone: x  / Bili: x / Urobili: x   Blood: x / Protein: x / Nitrite: x   Leuk Esterase: x / RBC: x / WBC x   Sq Epi: x / Non Sq Epi: x / Bacteria: x            INFLAMMATORY MARKERS      MICROBIOLOGY:        RADIOLOGY & ADDITIONAL STUDIES:

## 2023-10-01 NOTE — PROGRESS NOTE ADULT - PROBLEM SELECTOR PLAN 4
Chronic  - Cont Hydralazine, Losartan, HCTZ, Amlodipine  -Hydralazine stopped for now due to hypotension. Will continue to monitor and adjust meds as needed.

## 2023-10-01 NOTE — PROGRESS NOTE ADULT - SUBJECTIVE AND OBJECTIVE BOX
Patient is a 89y old  Male who presents with a chief complaint of Metabolic Encephalopathy (30 Sep 2023 10:35)      INTERVAL HPI/OVERNIGHT EVENTS: Patient seen and examined at bedside. Events noted for hypotension. BP stable now. Patient confused at baseline, not agitated now     MEDICATIONS  (STANDING):  amLODIPine   Tablet 5 milliGRAM(s) Oral daily  ammonium lactate 12% Lotion 1 Application(s) Topical two times a day  brimonidine 0.2% Ophthalmic Solution 1 Drop(s) Both EYES two times a day  calcium carbonate   1250 mG (OsCal) 1 Tablet(s) Oral daily  cefTRIAXone   IVPB 1000 milliGRAM(s) IV Intermittent every 24 hours  cholecalciferol 2000 Unit(s) Oral daily  escitalopram 10 milliGRAM(s) Oral daily  hydrochlorothiazide 25 milliGRAM(s) Oral daily  latanoprost 0.005% Ophthalmic Solution 1 Drop(s) Both EYES at bedtime  losartan 100 milliGRAM(s) Oral daily  melatonin 3 milliGRAM(s) Oral at bedtime  QUEtiapine 50 milliGRAM(s) Oral at bedtime  QUEtiapine 25 milliGRAM(s) Oral two times a day  sodium chloride 0.9%. 1000 milliLiter(s) (60 mL/Hr) IV Continuous <Continuous>  traZODone 100 milliGRAM(s) Oral at bedtime    MEDICATIONS  (PRN):  acetaminophen     Tablet .. 650 milliGRAM(s) Oral every 6 hours PRN Temp greater or equal to 38C (100.4F), Mild Pain (1 - 3)  aluminum hydroxide/magnesium hydroxide/simethicone Suspension 30 milliLiter(s) Oral every 4 hours PRN Dyspepsia  OLANZapine Injectable 2.5 milliGRAM(s) IntraMuscular every 6 hours PRN Agitation  ondansetron Injectable 4 milliGRAM(s) IV Push every 8 hours PRN Nausea and/or Vomiting      Allergies    No Known Allergies    Intolerances        REVIEW OF SYSTEMS: Unable to obtain due to confusion/advanced dementia   Vital Signs Last 24 Hrs  T(C): 36.4 (01 Oct 2023 08:11), Max: 36.9 (30 Sep 2023 23:22)  T(F): 97.5 (01 Oct 2023 08:11), Max: 98.5 (30 Sep 2023 23:22)  HR: 70 (01 Oct 2023 08:11) (56 - 86)  BP: 158/85 (01 Oct 2023 08:11) (80/50 - 158/85)  BP(mean): --  RR: 18 (01 Oct 2023 08:11) (16 - 18)  SpO2: 94% (01 Oct 2023 08:11) (92% - 95%)    Parameters below as of 01 Oct 2023 08:11  Patient On (Oxygen Delivery Method): room air  O2 Flow (L/min): 4      PHYSICAL EXAM:  GENERAL: NAD, confused  HEAD:  Atraumatic, Normocephalic  EYES: EOMI, PERRLA, conjunctiva and sclera clear  ENMT: No tonsillar erythema, exudates, or enlargement; Moist mucous membranes  NECK: Supple, No JVD, Normal thyroid  CHEST/LUNG: Clear to auscultation bilaterally; No wheezing   HEART: S1S2+,  Regular rate and rhythm  ABDOMEN: Soft, Nontender, Nondistended; Bowel sounds present  EXTREMITIES:  2+ Peripheral Pulses, No clubbing, cyanosis  LYMPH: No lymphadenopathy noted  SKIN: No rashes, warm, dry     LABS:            CAPILLARY BLOOD GLUCOSE        BLOOD CULTURE  09-28 @ 17:05   No growth at 48 Hours  --  --    RADIOLOGY & ADDITIONAL TESTS:    Imaging Personally Reviewed:  [ ] YES     Consultant(s) Notes Reviewed:      Care Discussed with Consultants/Other Providers:

## 2023-10-02 PROCEDURE — 99233 SBSQ HOSP IP/OBS HIGH 50: CPT

## 2023-10-02 RX ADMIN — LATANOPROST 1 DROP(S): 0.05 SOLUTION/ DROPS OPHTHALMIC; TOPICAL at 21:59

## 2023-10-02 RX ADMIN — BRIMONIDINE TARTRATE 1 DROP(S): 2 SOLUTION/ DROPS OPHTHALMIC at 17:13

## 2023-10-02 RX ADMIN — Medication 100 MILLIGRAM(S): at 21:58

## 2023-10-02 RX ADMIN — Medication 1 APPLICATION(S): at 05:43

## 2023-10-02 RX ADMIN — QUETIAPINE FUMARATE 25 MILLIGRAM(S): 200 TABLET, FILM COATED ORAL at 05:42

## 2023-10-02 RX ADMIN — Medication 2000 UNIT(S): at 11:58

## 2023-10-02 RX ADMIN — ESCITALOPRAM OXALATE 10 MILLIGRAM(S): 10 TABLET, FILM COATED ORAL at 11:58

## 2023-10-02 RX ADMIN — LOSARTAN POTASSIUM 100 MILLIGRAM(S): 100 TABLET, FILM COATED ORAL at 05:41

## 2023-10-02 RX ADMIN — AMLODIPINE BESYLATE 5 MILLIGRAM(S): 2.5 TABLET ORAL at 05:47

## 2023-10-02 RX ADMIN — Medication 1 APPLICATION(S): at 17:12

## 2023-10-02 RX ADMIN — CEFTRIAXONE 100 MILLIGRAM(S): 500 INJECTION, POWDER, FOR SOLUTION INTRAMUSCULAR; INTRAVENOUS at 11:57

## 2023-10-02 RX ADMIN — QUETIAPINE FUMARATE 25 MILLIGRAM(S): 200 TABLET, FILM COATED ORAL at 17:13

## 2023-10-02 RX ADMIN — BRIMONIDINE TARTRATE 1 DROP(S): 2 SOLUTION/ DROPS OPHTHALMIC at 05:43

## 2023-10-02 RX ADMIN — Medication 1 TABLET(S): at 11:58

## 2023-10-02 RX ADMIN — Medication 3 MILLIGRAM(S): at 21:59

## 2023-10-02 NOTE — PROGRESS NOTE ADULT - SUBJECTIVE AND OBJECTIVE BOX
Patient is a 89y old  Male who presents with a chief complaint of Metabolic Encephalopathy (01 Oct 2023 08:47)      INTERVAL HPI/OVERNIGHT EVENTS: Patient seen and examined at bedside. Confused. Awake, alert.     MEDICATIONS  (STANDING):  amLODIPine   Tablet 5 milliGRAM(s) Oral daily  ammonium lactate 12% Lotion 1 Application(s) Topical two times a day  brimonidine 0.2% Ophthalmic Solution 1 Drop(s) Both EYES two times a day  calcium carbonate   1250 mG (OsCal) 1 Tablet(s) Oral daily  cefTRIAXone   IVPB 1000 milliGRAM(s) IV Intermittent every 24 hours  cholecalciferol 2000 Unit(s) Oral daily  escitalopram 10 milliGRAM(s) Oral daily  hydrochlorothiazide 25 milliGRAM(s) Oral daily  latanoprost 0.005% Ophthalmic Solution 1 Drop(s) Both EYES at bedtime  losartan 100 milliGRAM(s) Oral daily  melatonin 3 milliGRAM(s) Oral at bedtime  QUEtiapine 25 milliGRAM(s) Oral two times a day  sodium chloride 0.9%. 1000 milliLiter(s) (60 mL/Hr) IV Continuous <Continuous>  traZODone 100 milliGRAM(s) Oral at bedtime    MEDICATIONS  (PRN):  acetaminophen     Tablet .. 650 milliGRAM(s) Oral every 6 hours PRN Temp greater or equal to 38C (100.4F), Mild Pain (1 - 3)  aluminum hydroxide/magnesium hydroxide/simethicone Suspension 30 milliLiter(s) Oral every 4 hours PRN Dyspepsia  hydrALAZINE Injectable 10 milliGRAM(s) IV Push once PRN for SBP >150 Manual Check  OLANZapine Injectable 2.5 milliGRAM(s) IntraMuscular every 6 hours PRN Agitation  ondansetron Injectable 4 milliGRAM(s) IV Push every 8 hours PRN Nausea and/or Vomiting      Allergies    No Known Allergies    Intolerances        REVIEW OF SYSTEMS:  Unable to obtain due to confusion   Vital Signs Last 24 Hrs  T(C): 36.6 (02 Oct 2023 05:20), Max: 36.6 (02 Oct 2023 05:20)  T(F): 97.8 (02 Oct 2023 05:20), Max: 97.8 (02 Oct 2023 05:20)  HR: 68 (02 Oct 2023 05:20) (60 - 68)  BP: 165/76 (02 Oct 2023 05:20) (142/74 - 165/76)  BP(mean): --  RR: 19 (02 Oct 2023 05:20) (16 - 19)  SpO2: 97% (02 Oct 2023 05:20) (94% - 97%)    Parameters below as of 02 Oct 2023 05:20  Patient On (Oxygen Delivery Method): room air        PHYSICAL EXAM:  GENERAL: NAD, confused, awake  HEAD:  Atraumatic, Normocephalic  EYES: EOMI, PERRLA, conjunctiva and sclera clear  ENMT: No tonsillar erythema, exudates, or enlargement; Moist mucous membranes  NECK: Supple, No JVD, Normal thyroid  CHEST/LUNG: Clear to auscultation bilaterally; No wheezing   HEART: S1S2+,  Regular rate and rhythm  ABDOMEN: Soft, Nontender, Nondistended; Bowel sounds present  EXTREMITIES:  2+ Peripheral Pulses, No clubbing, cyanosis  LYMPH: No lymphadenopathy noted  SKIN: No rashes, warm, dry   LABS:      Ca    8.4        01 Oct 2023 07:25        CAPILLARY BLOOD GLUCOSE        BLOOD CULTURE  09-29 @ 11:09   50,000 - 99,000 CFU/mL Aerococcus species  "Aerococcus spp. are predictably susceptible to penicillin,  ampicillin, tetracycline, and vancomycin.  All isolates are  resistant to sulfonamides"  --  --  09-28 @ 17:05   No growth at 72 Hours  --  --    RADIOLOGY & ADDITIONAL TESTS:    Imaging Personally Reviewed:  [ ] YES     Consultant(s) Notes Reviewed:      Care Discussed with Consultants/Other Providers:

## 2023-10-02 NOTE — PROGRESS NOTE ADULT - PROBLEM SELECTOR PLAN 1
Recurrent UTIs over the past 2 months, each requiring hospitalization. Admitted for  encephalopathy likely metabolic 2/2 UTI, hypothermia   -RVP Negative   - afebrile today   -Tylenol PRN fever   - Continue with Rocephin  - BC NGTD. UC + for 50.000 to 90.000 Aerococcus Sp   - Continue warming blanket and bear-hugger. Tylenol prn fevers  - s/p IVF x 12 hours. Monitor Oral intake   - ID (Dr. Gonzalez) following

## 2023-10-02 NOTE — PROGRESS NOTE ADULT - SUBJECTIVE AND OBJECTIVE BOX
[facility], Division of Infectious Diseases  PIPER Moya Y. Patel, S. Shah, G. [facility]  [phone]    Name: CELESTIEN NUR  Age: 89y  Gender: Male  MRN: [id number]    Interval History:  Patient seen and examined at bedside  No acute overnight events.   Notes reviewed    Antibiotics:  cefTRIAXone   IVPB 1000 milliGRAM(s) IV Intermittent every 24 hours      Medications:  acetaminophen     Tablet .. 650 milliGRAM(s) Oral every 6 hours PRN  aluminum hydroxide/magnesium hydroxide/simethicone Suspension 30 milliLiter(s) Oral every 4 hours PRN  amLODIPine   Tablet 5 milliGRAM(s) Oral daily  ammonium lactate 12% Lotion 1 Application(s) Topical two times a day  brimonidine 0.2% Ophthalmic Solution 1 Drop(s) Both EYES two times a day  calcium carbonate   1250 mG (OsCal) 1 Tablet(s) Oral daily  cefTRIAXone   IVPB 1000 milliGRAM(s) IV Intermittent every 24 hours  cholecalciferol 2000 Unit(s) Oral daily  escitalopram 10 milliGRAM(s) Oral daily  hydrALAZINE Injectable 10 milliGRAM(s) IV Push once PRN  hydrochlorothiazide 25 milliGRAM(s) Oral daily  latanoprost 0.005% Ophthalmic Solution 1 Drop(s) Both EYES at bedtime  losartan 100 milliGRAM(s) Oral daily  melatonin 3 milliGRAM(s) Oral at bedtime  OLANZapine Injectable 2.5 milliGRAM(s) IntraMuscular every 6 hours PRN  ondansetron Injectable 4 milliGRAM(s) IV Push every 8 hours PRN  QUEtiapine 25 milliGRAM(s) Oral two times a day  sodium chloride 0.9%. 1000 milliLiter(s) IV Continuous <Continuous>  traZODone 100 milliGRAM(s) Oral at bedtime      Review of Systems:  unable to obtain    Allergies: No Known Allergies    For details regarding the patient's past medical history, social history, family history, and other miscellaneous elements, please refer the initial infectious diseases consultation and/or the admitting history and physical examination for this admission.    Objective:  Vitals:   T(C): 36.6 (10-02-23 @ 05:20), Max: 36.6 (10-02-23 @ 05:20)  HR: 68 (10-02-23 @ 05:20) (60 - 68)  BP: 165/76 (10-02-23 @ 05:20) (148/73 - 165/76)  RR: 19 (10-02-23 @ 05:20) (16 - 19)  SpO2: 97% (10-02-23 @ 05:20) (94% - 97%)    Physical Examination:  General: no acute distress  HEENT: NC/AT, EOMI,  Cardio: S1, S2 heard, RRR, no murmurs  Resp: decreased breath sounds  Abd: soft, NT, ND  Ext: no edema or cyanosis  Skin: warm, dry, no visible rash      Laboratory Studies:  CBC:                       8.0    4.03  )-----------( 81       ( 01 Oct 2023 07:25 )             24.4     CMP: 10-01    141  |  109<H>  |  16  ----------------------------<  93  3.5   |  26  |  0.93    Ca    8.4<L>      01 Oct 2023 07:25        Urinalysis Basic - ( 01 Oct 2023 07:25 )    Color: x / Appearance: x / SG: x / pH: x  Gluc: 93 mg/dL / Ketone: x  / Bili: x / Urobili: x   Blood: x / Protein: x / Nitrite: x   Leuk Esterase: x / RBC: x / WBC x   Sq Epi: x / Non Sq Epi: x / Bacteria: x        Microbiology: reviewed    Culture - Urine (collected 09-29-23 @ 11:09)  Source: Clean Catch Clean Catch (Midstream)  Final Report (10-01-23 @ 21:44):    50,000 - 99,000 CFU/mL Aerococcus species    "Aerococcus spp. are predictably susceptible to penicillin,    ampicillin, tetracycline, and vancomycin.  All isolates are    resistant to sulfonamides"    Culture - Blood (collected 09-28-23 @ 17:05)  Source: .Blood Blood-Peripheral  Preliminary Report (10-02-23 @ 02:01):    No growth at 72 Hours    Culture - Blood (collected 09-28-23 @ 17:05)  Source: .Blood Blood-Peripheral  Preliminary Report (10-02-23 @ 02:01):    No growth at 72 Hours          Radiology: reviewed

## 2023-10-02 NOTE — CHART NOTE - NSCHARTNOTEFT_GEN_A_CORE
Overnight RN called for manual BP 80/50. Pt given 1 L bolus of fluid. Following bolus repeat /67.
Assessment: Pt seen for nutrition follow-up. Chart reviewed, hospital course noted.    Brief hx: Pt is a "89 y/o M with PMHx vascular dementia, dysphagia, pancytopenia, HTN, GERD, constipation presenting to the ED from [facility] with worsening agitation and confusion. Admitted for worsening agitation/ fever due to UTI and metabolic encephalopathy."    Visited pt at bedside this am. Pt alert/confused during visit. Breakfast tray left at bedside; observed pt consume ~50% of meal (consumed 100% of hot cereal, 1/2 puree [place] toast, few sips of ensure shake). PO intakes variable; ranging from 0-100% per nursing documentation. Pt tolerating diet. No chewing/swallowing difficulties with current diet rx noted. No N/V/D/C per chart review. +BM 9/30. Continues on puree diet with mildly thick liquids. Receiving ensure plus HP bid. Providing magic cup daily. Recommend continued assistance/encouragement with meals and oral nutritional supplements.     Factors impacting intake: [ ] none [ ] nausea  [ ] vomiting [ ] diarrhea [ ] constipation  [X] chewing problems [X] swallowing issues  [X] other: total feed    Diet Prescription: Diet, Pureed:   Mildly Thick Liquids (MILDTHICKLIQS)  Supplement Feeding Modality:  Oral  Ensure Plus High Protein Cans or Servings Per Day:  1       Frequency:  Two Times a day (09-29-23 @ 11:38)    Intake: variable; poor-fair    Current Weight: Weight (kg): 62.2 (09-28 @ 15:22), 10/2 135.1# (no edema noted)  % Weight Change    Pertinent Medications: MEDICATIONS  (STANDING):  amLODIPine   Tablet 5 milliGRAM(s) Oral daily  ammonium lactate 12% Lotion 1 Application(s) Topical two times a day  brimonidine 0.2% Ophthalmic Solution 1 Drop(s) Both EYES two times a day  calcium carbonate   1250 mG (OsCal) 1 Tablet(s) Oral daily  cefTRIAXone   IVPB 1000 milliGRAM(s) IV Intermittent every 24 hours  cholecalciferol 2000 Unit(s) Oral daily  escitalopram 10 milliGRAM(s) Oral daily  hydrochlorothiazide 25 milliGRAM(s) Oral daily  latanoprost 0.005% Ophthalmic Solution 1 Drop(s) Both EYES at bedtime  losartan 100 milliGRAM(s) Oral daily  melatonin 3 milliGRAM(s) Oral at bedtime  QUEtiapine 25 milliGRAM(s) Oral two times a day  sodium chloride 0.9%. 1000 milliLiter(s) (60 mL/Hr) IV Continuous <Continuous>  traZODone 100 milliGRAM(s) Oral at bedtime    MEDICATIONS  (PRN):  acetaminophen     Tablet .. 650 milliGRAM(s) Oral every 6 hours PRN Temp greater or equal to 38C (100.4F), Mild Pain (1 - 3)  aluminum hydroxide/magnesium hydroxide/simethicone Suspension 30 milliLiter(s) Oral every 4 hours PRN Dyspepsia  hydrALAZINE Injectable 10 milliGRAM(s) IV Push once PRN for SBP >150 Manual Check  OLANZapine Injectable 2.5 milliGRAM(s) IntraMuscular every 6 hours PRN Agitation  ondansetron Injectable 4 milliGRAM(s) IV Push every 8 hours PRN Nausea and/or Vomiting    Pertinent Labs: 10-01 Na141 mmol/L Glu 93 mg/dL K+ 3.5 mmol/L Cr  0.93 mg/dL BUN 16 mg/dL 09-29 Alb 2.4 g/dL<L>    Skin: ecchymotic, no pressure ulcers noted    Estimated Needs:   [X] no change since previous assessment: based on #/72.5kg  25-30kcal/kg (1812-2175kcal)  1-1.2g pro/kg (72-87gm protein)  [ ] recalculated:     Previous Nutrition Diagnosis:   [ ] Inadequate Energy Intake [ ]Inadequate Oral Intake [ ] Excessive Energy Intake   [ ] Underweight [ ] Increased Nutrient Needs [ ] Overweight/Obesity [X] Swallowing Difficulty  [ ] Altered GI Function [ ] Unintended Weight Loss [ ] Food & Nutrition Related Knowledge Deficit [X] Malnutrition (severe, chronic)    Nutrition Diagnosis is [X] ongoing  [ ] resolved [ ] not applicable     New Nutrition Diagnosis: [X] not applicable     Interventions:   Recommend  [ ] Change Diet To:  [ ] Nutrition Supplement  [ ] Nutrition Support  [X] Other: Continue current diet as ordered; puree consistency with mildly thick liquids, ensure plus HP BID  Will continue to provide Magic Cup fortified ice cream daily  Continued assistance/encouragement at meal times    Monitoring and Evaluation:   [X] PO intake [ x ] Tolerance to diet prescription [ x ] weights [ x ] labs[ x ] follow up per protocol  [X] other: s/s GI distress, bowel function, skin integrity/ edema

## 2023-10-02 NOTE — GOALS OF CARE CONVERSATION - ADVANCED CARE PLANNING - NS PRO AD PATIENT TYPE
Health Care Proxy (HCP)/Medical Orders for Life-Sustaining Treatment (MOLST)/Living Will
Health Care Proxy (HCP)/Medical Orders for Life-Sustaining Treatment (MOLST)/Living Will

## 2023-10-02 NOTE — PROGRESS NOTE ADULT - PROBLEM SELECTOR PLAN 2
Chronic, progressive. Worsening agitation for several months, minimally verbal, cachectic   - Cont Zyprexa, Trazadone, Lexapro  - Seroquel added here by Psych   -DNR/DNI

## 2023-10-02 NOTE — GOALS OF CARE CONVERSATION - ADVANCED CARE PLANNING - CONVERSATION DETAILS
Palliative care SW spoke with patient's nisteffen Loera to discuss GOC. Reviewed patient's medical and social history as well as events leading to patient's hospitalization. Writer discussed patient's current diagnosis (metabolic encephalopathy; vascular dementia; dysphasia, pancytopenia, HTN, GERD, constipation, advanced age, debility), medical condition and management,  prognosis, and life expectancy. Patient resides at the [facility] and plan is for him to return to that facility for LTC placement. Discussed with kavon option for comfort care at [facility] upon patient's return. Explained to her what comfort care entails. [facility] admissions confirmed that they do not have hospice services in the facility however provide comfort care. Kavon stated that she does not want patient to suffer and wants him to be comfortable. She also stated that she knows patient has been through a lot, however, is unsure if she wants to avoid future hospitalizations. She states she will think about information provided. SW to follow up with kavon on Monday. She showed insight into medical condition. All questions answered. Psychosocial support provided.
Palliative care SW spoke with patient's kavon Loera to follow up on [facility]. Kavon stated that she has not made a decision about comfort care at [facility] and believes patient is going to be discharged back to that facility in the next few days. She stated that she can follow up with the facility once she has made a decision. SW contact information provided. All questions answered.

## 2023-10-03 LAB
ANION GAP SERPL CALC-SCNC: 5 MMOL/L — SIGNIFICANT CHANGE UP (ref 5–17)
BUN SERPL-MCNC: 24 MG/DL — HIGH (ref 7–23)
CALCIUM SERPL-MCNC: 9.1 MG/DL — SIGNIFICANT CHANGE UP (ref 8.5–10.1)
CHLORIDE SERPL-SCNC: 107 MMOL/L — SIGNIFICANT CHANGE UP (ref 96–108)
CO2 SERPL-SCNC: 25 MMOL/L — SIGNIFICANT CHANGE UP (ref 22–31)
CREAT SERPL-MCNC: 0.96 MG/DL — SIGNIFICANT CHANGE UP (ref 0.5–1.3)
EGFR: 72 ML/MIN/1.73M2 — SIGNIFICANT CHANGE UP
GLUCOSE SERPL-MCNC: 102 MG/DL — HIGH (ref 70–99)
HCT VFR BLD CALC: 28.8 % — LOW (ref 39–50)
HGB BLD-MCNC: 9.6 G/DL — LOW (ref 13–17)
MCHC RBC-ENTMCNC: 29.6 PG — SIGNIFICANT CHANGE UP (ref 27–34)
MCHC RBC-ENTMCNC: 33.3 GM/DL — SIGNIFICANT CHANGE UP (ref 32–36)
MCV RBC AUTO: 88.9 FL — SIGNIFICANT CHANGE UP (ref 80–100)
NRBC # BLD: 0 /100 WBCS — SIGNIFICANT CHANGE UP (ref 0–0)
PLATELET # BLD AUTO: 99 K/UL — LOW (ref 150–400)
POTASSIUM SERPL-MCNC: 5 MMOL/L — SIGNIFICANT CHANGE UP (ref 3.5–5.3)
POTASSIUM SERPL-SCNC: 5 MMOL/L — SIGNIFICANT CHANGE UP (ref 3.5–5.3)
RBC # BLD: 3.24 M/UL — LOW (ref 4.2–5.8)
RBC # FLD: 13.8 % — SIGNIFICANT CHANGE UP (ref 10.3–14.5)
SODIUM SERPL-SCNC: 137 MMOL/L — SIGNIFICANT CHANGE UP (ref 135–145)
WBC # BLD: 5.34 K/UL — SIGNIFICANT CHANGE UP (ref 3.8–10.5)
WBC # FLD AUTO: 5.34 K/UL — SIGNIFICANT CHANGE UP (ref 3.8–10.5)

## 2023-10-03 PROCEDURE — 99233 SBSQ HOSP IP/OBS HIGH 50: CPT

## 2023-10-03 RX ORDER — HYDRALAZINE HCL 50 MG
25 TABLET ORAL EVERY 12 HOURS
Refills: 0 | Status: DISCONTINUED | OUTPATIENT
Start: 2023-10-03 | End: 2023-10-05

## 2023-10-03 RX ORDER — CEFTRIAXONE 500 MG/1
1 INJECTION, POWDER, FOR SOLUTION INTRAMUSCULAR; INTRAVENOUS
Refills: 0 | DISCHARGE

## 2023-10-03 RX ADMIN — LATANOPROST 1 DROP(S): 0.05 SOLUTION/ DROPS OPHTHALMIC; TOPICAL at 21:31

## 2023-10-03 RX ADMIN — BRIMONIDINE TARTRATE 1 DROP(S): 2 SOLUTION/ DROPS OPHTHALMIC at 06:01

## 2023-10-03 RX ADMIN — QUETIAPINE FUMARATE 25 MILLIGRAM(S): 200 TABLET, FILM COATED ORAL at 17:56

## 2023-10-03 RX ADMIN — QUETIAPINE FUMARATE 25 MILLIGRAM(S): 200 TABLET, FILM COATED ORAL at 06:01

## 2023-10-03 RX ADMIN — Medication 25 MILLIGRAM(S): at 17:56

## 2023-10-03 RX ADMIN — BRIMONIDINE TARTRATE 1 DROP(S): 2 SOLUTION/ DROPS OPHTHALMIC at 17:57

## 2023-10-03 RX ADMIN — AMLODIPINE BESYLATE 5 MILLIGRAM(S): 2.5 TABLET ORAL at 06:00

## 2023-10-03 RX ADMIN — Medication 1 TABLET(S): at 14:09

## 2023-10-03 RX ADMIN — Medication 1 APPLICATION(S): at 06:02

## 2023-10-03 RX ADMIN — Medication 1 APPLICATION(S): at 17:55

## 2023-10-03 RX ADMIN — Medication 3 MILLIGRAM(S): at 21:31

## 2023-10-03 RX ADMIN — Medication 2000 UNIT(S): at 14:09

## 2023-10-03 RX ADMIN — Medication 100 MILLIGRAM(S): at 21:31

## 2023-10-03 RX ADMIN — LOSARTAN POTASSIUM 100 MILLIGRAM(S): 100 TABLET, FILM COATED ORAL at 06:01

## 2023-10-03 RX ADMIN — ESCITALOPRAM OXALATE 10 MILLIGRAM(S): 10 TABLET, FILM COATED ORAL at 14:08

## 2023-10-03 NOTE — DISCHARGE NOTE PROVIDER - NSDCMRMEDTOKEN_GEN_ALL_CORE_FT
acetaminophen 325 mg oral tablet: 2 tab(s) orally every 6 hours  amLODIPine 5 mg oral tablet: 1 tab(s) orally once a day  ammonium lactate 12% topical lotion: Apply topically to affected area 2 times a day  brimonidine 0.2% ophthalmic solution: 1 drop(s) in each eye 2 times a day  calcium carbonate 648 mg oral tablet: 2 tab(s) orally once a day  cholecalciferol 50 mcg (2000 intl units) oral tablet: 1 tab(s) orally once a day  docusate sodium 100 mg oral capsule: 1 cap(s) orally once a day  enoxaparin 30 mg/0.3 mL injectable solution: 30 milligram(s) subcutaneously once a day  escitalopram 10 mg oral tablet: 1 tab(s) orally once a day  hydrALAZINE 10 mg oral tablet: 1 tab(s) orally 3 times a day  hydroCHLOROthiazide 25 mg oral tablet: 1 tab(s) orally once a day  latanoprost 0.005% ophthalmic solution: 1 drop(s) in each eye once a day  losartan 100 mg oral tablet: 1 tab(s) orally once a day  melatonin 3 mg oral tablet: 1 tab(s) orally once a day (at bedtime)  QUEtiapine 25 mg oral tablet: 1 tab(s) orally 2 times a day  sodium chloride 0.9% injectable solution: 70 milliliter(s) injectable every hour  traZODone 100 mg oral tablet: 1 tab(s) orally once a day (at bedtime)   acetaminophen 325 mg oral tablet: 2 tab(s) orally every 6 hours  amLODIPine 5 mg oral tablet: 1 tab(s) orally once a day  ammonium lactate 12% topical lotion: Apply topically to affected area 2 times a day  brimonidine 0.2% ophthalmic solution: 1 drop(s) in each eye 2 times a day  calcium carbonate 648 mg oral tablet: 2 tab(s) orally once a day  cholecalciferol 50 mcg (2000 intl units) oral tablet: 1 tab(s) orally once a day  docusate sodium 100 mg oral capsule: 1 cap(s) orally once a day  enoxaparin 30 mg/0.3 mL injectable solution: 30 milligram(s) subcutaneously once a day  escitalopram 10 mg oral tablet: 1 tab(s) orally once a day  hydrALAZINE 10 mg oral tablet: 1 tab(s) orally 3 times a day  hydroCHLOROthiazide 25 mg oral tablet: 1 tab(s) orally once a day  latanoprost 0.005% ophthalmic solution: 1 drop(s) in each eye once a day  losartan 100 mg oral tablet: 1 tab(s) orally once a day  melatonin 3 mg oral tablet: 1 tab(s) orally once a day (at bedtime)  QUEtiapine 25 mg oral tablet: 1 tab(s) orally 2 times a day  traZODone 100 mg oral tablet: 1 tab(s) orally once a day (at bedtime)

## 2023-10-03 NOTE — PROGRESS NOTE ADULT - PROBLEM SELECTOR PLAN 1
Recurrent UTIs over the past 2 months, each requiring hospitalization. Admitted for  encephalopathy likely metabolic 2/2 UTI, hypothermia. Resolved   -RVP Negative   - afebrile today   -Tylenol PRN fever   - Completed 5 days course of  Rocephin  - BC NGTD. UC + for 50.000 to 90.000 Aerococcus Sp   - Continue warming blanket and bear-hugger. Tylenol prn fevers  - Monitor Oral intake   - ID (Dr. Gonzalez)  -For discharge to SNF

## 2023-10-03 NOTE — PROGRESS NOTE ADULT - SUBJECTIVE AND OBJECTIVE BOX
Patient is a 89y old  Male who presents with a chief complaint of Metabolic Encephalopathy (03 Oct 2023 09:10)       INTERVAL HPI/OVERNIGHT EVENTS: Patient seen and examined at bedside. Awake, alert, no distress. Confused at baseline     MEDICATIONS  (STANDING):  amLODIPine   Tablet 5 milliGRAM(s) Oral daily  ammonium lactate 12% Lotion 1 Application(s) Topical two times a day  brimonidine 0.2% Ophthalmic Solution 1 Drop(s) Both EYES two times a day  calcium carbonate   1250 mG (OsCal) 1 Tablet(s) Oral daily  cholecalciferol 2000 Unit(s) Oral daily  escitalopram 10 milliGRAM(s) Oral daily  hydrALAZINE 25 milliGRAM(s) Oral every 12 hours  hydrochlorothiazide 25 milliGRAM(s) Oral daily  latanoprost 0.005% Ophthalmic Solution 1 Drop(s) Both EYES at bedtime  losartan 100 milliGRAM(s) Oral daily  melatonin 3 milliGRAM(s) Oral at bedtime  QUEtiapine 25 milliGRAM(s) Oral two times a day  sodium chloride 0.9%. 1000 milliLiter(s) (60 mL/Hr) IV Continuous <Continuous>  traZODone 100 milliGRAM(s) Oral at bedtime    MEDICATIONS  (PRN):  acetaminophen     Tablet .. 650 milliGRAM(s) Oral every 6 hours PRN Temp greater or equal to 38C (100.4F), Mild Pain (1 - 3)  aluminum hydroxide/magnesium hydroxide/simethicone Suspension 30 milliLiter(s) Oral every 4 hours PRN Dyspepsia  hydrALAZINE Injectable 10 milliGRAM(s) IV Push once PRN for SBP >150 Manual Check  OLANZapine Injectable 2.5 milliGRAM(s) IntraMuscular every 6 hours PRN Agitation  ondansetron Injectable 4 milliGRAM(s) IV Push every 8 hours PRN Nausea and/or Vomiting      Allergies    No Known Allergies    Intolerances        REVIEW OF SYSTEMS:  Unable to obtain, dementia/ confused at baseline   Vital Signs Last 24 Hrs  T(C): 36.6 (03 Oct 2023 12:03), Max: 37 (03 Oct 2023 05:05)  T(F): 97.8 (03 Oct 2023 12:03), Max: 98.6 (03 Oct 2023 05:05)  HR: 63 (03 Oct 2023 12:03) (61 - 67)  BP: 108/65 (03 Oct 2023 12:03) (108/65 - 150/74)  BP(mean): --  RR: 18 (03 Oct 2023 12:03) (17 - 18)  SpO2: 95% (03 Oct 2023 12:03) (95% - 99%)    Parameters below as of 03 Oct 2023 12:03  Patient On (Oxygen Delivery Method): room air        PHYSICAL EXAM:  GENERAL: NAD, confused, awake  HEAD:  Atraumatic, Normocephalic  EYES: EOMI, PERRLA, conjunctiva and sclera clear  ENMT: No tonsillar erythema, exudates, or enlargement; Moist mucous membranes  NECK: Supple, No JVD, Normal thyroid  CHEST/LUNG: Clear to auscultation bilaterally; No wheezing   HEART: S1S2+,  Regular rate and rhythm  ABDOMEN: Soft, Nontender, Nondistended; Bowel sounds present  EXTREMITIES:  2+ Peripheral Pulses, No clubbing, cyanosis  LYMPH: No lymphadenopathy noted  SKIN: No rashes, warm, dry     LABS:                        9.6    5.34  )-----------( 99       ( 03 Oct 2023 05:30 )             28.8     03 Oct 2023 05:30    137    |  107    |  24     ----------------------------<  102    5.0     |  25     |  0.96     Ca    9.1        03 Oct 2023 05:30        CAPILLARY BLOOD GLUCOSE        BLOOD CULTURE    RADIOLOGY & ADDITIONAL TESTS:    Imaging Personally Reviewed:  [ ] YES     Consultant(s) Notes Reviewed:      Care Discussed with Consultants/Other Providers:

## 2023-10-03 NOTE — DISCHARGE NOTE PROVIDER - DETAILS OF MALNUTRITION DIAGNOSIS/DIAGNOSES
This patient has been assessed with a concern for Malnutrition and was treated during this hospitalization for the following Nutrition diagnosis/diagnoses:     -  09/29/2023: Severe protein-calorie malnutrition

## 2023-10-03 NOTE — DISCHARGE NOTE PROVIDER - NSDCQMPCI_CARD_ALL_CORE
This note was copied from a baby's chart. In to follow up with mom and infant prior to discharge to home. Mom stated that infant latched and nursed well this morning. Next feeding she would not maintain a latch. Mom pumped and fed infant expressed colostrum/breastmilk. Reviewed discharge information as well as a feeding plan. Mom rented a breastpump and I reviewed how to use the pump as well as the breast pump kit. Informed mom that the pump kit was hers to keep. Mom not sure yet which pediatrician she is following up with. Informed her that she can follow up with our outpatient lactation consultant as needed. Encouraged an outpatient follow up. No

## 2023-10-03 NOTE — DISCHARGE NOTE PROVIDER - HOSPITAL COURSE
Patient admitted for UTI, metabolic encephalopathy. Treated with IV Ceftriaxone. Has completed the course. Mental status at baseline. Blood cultures are negative. Patient cleared for transitioned back to [facility] facility. Seen and examined. VSS   GENERAL: NAD, confused, awake  HEAD:  Atraumatic, Normocephalic  EYES: EOMI, PERRLA, conjunctiva and sclera clear  ENMT: No tonsillar erythema, exudates, or enlargement; Moist mucous membranes  NECK: Supple, No JVD, Normal thyroid  CHEST/LUNG: Clear to auscultation bilaterally; No wheezing   HEART: S1S2+,  Regular rate and rhythm  ABDOMEN: Soft, Nontender, Nondistended; Bowel sounds present  EXTREMITIES:  2+ Peripheral Pulses, No clubbing, cyanosis  LYMPH: No lymphadenopathy noted  SKIN: No rashes, warm, dry     Totals discharge time spent 45 minutes, including medication reconciliation, care coordination. ADMISSION H+P:    HPI:  89 y/o M with PMHx vascular dementia, dysphagia, pancytopenia, HTN, GERD, constipation presenting to the ED from [facility] nursing home with worsening agitation and confusion. Patient is minimally verbal at baseline. Most HPI obtained from niece via phone.  He was hospitalized in [place] for a UTI in August. Afterwards, family decided patient could not live alone, and moved him to [facility] here in [place] in late August. Shortly afterwards, patient was admitted to [facility] for a UTI. He was discharge back to the nursing home. However, yesterday and today patient was more agitated and confused. Rocephin was started in nursing home for suspected UTI, and he was transferred to [facility]". Patient unable to participate in ROS, but does not appear in distress or discomfort.     ED course    Vitals: 97.8 F, HR 58, /59, RR 16, SpO2 100% RA  Labs: Hgb 9, Hct 28, Plt 91, Alb 2.6  EKG: NSR @ 67bpm,   Given: Zosyn, 1L NS bolus    CXR: no acute cardiopulmonary disease (personal read) (28 Sep 2023 20:32)      ---  HOSPITAL COURSE: Patient was admitted for altered mental status and acute cystitis. UCx 50,000 - 99,000 CFU/mL Aerococcus species. S/p ceftriaxone x5 day course from 9/28-10/2.    Patient was medically optimized and improved clinically throughout hospital course. Patient seen and examined on day of discharge.    Vital Signs  T(C): 36.4 (05 Oct 2023 14:06), Max: 36.7 (04 Oct 2023 21:51)  T(F): 97.5 (05 Oct 2023 14:06), Max: 98 (04 Oct 2023 21:51)  HR: 51 (05 Oct 2023 14:06) (51 - 90)  BP: 102/47 (05 Oct 2023 14:06) (102/47 - 163/82)  RR: 17 (05 Oct 2023 14:06) (17 - 19)  SpO2: 98% (05 Oct 2023 14:06) (93% - 98%)    Physical Exam:  General: well-developed, well-nourished, NAD  HEENT: normocephalic, atraumatic, EOMI, moist mucous membranes   Neck: supple, non-tender, no masses  Neurology: awake, alert  Respiratory: clear to auscultation bilaterally; no wheezes, rhonchi, or rales  CV: regular rate and rhythm, soft S1/S2, no murmurs, rubs, or gallops  Abdominal: soft, non-tender, non-distended, bowel sounds present  Extremities: no clubbing, cyanosis, or edema; palpable peripheral pulses  Musculoskeletal: no joint erythema or warmth, no joint swelling   Skin: warm, dry, normal color    Patient is medically stable for discharge to [facility] with outpatient follow up.  ---  CONSULTANTS:     ---  TIME SPENT:   I, the attending physician, was physically present for the key portions of the evaluation and management (E/M) service provided. The total amount of time spent reviewing the hospital course, laboratory values, imaging findings, assessing/counseling the patient, discussing with consultant physicians, social work, nursing staff was 35 minutes.     ---  FINAL DISCHARGE DIAGNOSIS LIST:  Please see last daily progress note for final discharge diagnoses

## 2023-10-03 NOTE — DISCHARGE NOTE PROVIDER - NSDCCPCAREPLAN_GEN_ALL_CORE_FT
PRINCIPAL DISCHARGE DIAGNOSIS  Diagnosis: Hypothermia  Assessment and Plan of Treatment: Resolved   UTI: completed course of IV antibiotcs  Resume meds  f/u with all your doctors     PRINCIPAL DISCHARGE DIAGNOSIS  Diagnosis: Hypothermia  Assessment and Plan of Treatment: Resolved   UTI: completed course of IV antibiotcs  Resume home meds  f/u with all your doctors

## 2023-10-03 NOTE — PROGRESS NOTE ADULT - PROBLEM SELECTOR PLAN 2
Chronic, progressive. Worsening agitation for several months, minimally verbal, cachectic   - Cont Zyprexa, Trazadone, Lexapro  - Continue Seroquel   -DNR/DNI

## 2023-10-03 NOTE — PROGRESS NOTE ADULT - TIME BILLING
Note written by attending. Meds, labs, vitals, chart reviewed. D/w staff
Note written by attending. Meds, labs, vitals, chart reviewed. D/w staff
Note written by attending. Meds, labs, vitals, chart reviewed
Note written by attending. Meds, labs, vitals, chart reviewed. D/w RUTH
Note written by attending. Meds, labs, vitals, chart reviewed. D/w staff

## 2023-10-03 NOTE — SOCIAL WORK PROGRESS NOTE - NSSWPROGRESSNOTE_GEN_ALL_CORE
PT eval is pending for patient. Anticipated plan is for pt to ret to [facility] for short term care. Sw has left message for [facility] admissions in re to anticipated dc back to [facility] for continued estevan/

## 2023-10-03 NOTE — DISCHARGE NOTE PROVIDER - CARE PROVIDER_API CALL
Fernanda Jimenez Camilo  [address] [place] [address]  Phone: [phone]  Fax: [phone]  Follow Up Time:

## 2023-10-04 LAB
CULTURE RESULTS: SIGNIFICANT CHANGE UP
CULTURE RESULTS: SIGNIFICANT CHANGE UP
SPECIMEN SOURCE: SIGNIFICANT CHANGE UP
SPECIMEN SOURCE: SIGNIFICANT CHANGE UP

## 2023-10-04 PROCEDURE — 99232 SBSQ HOSP IP/OBS MODERATE 35: CPT

## 2023-10-04 PROCEDURE — 99221 1ST HOSP IP/OBS SF/LOW 40: CPT

## 2023-10-04 RX ORDER — OLANZAPINE 15 MG/1
2.5 TABLET, FILM COATED ORAL
Refills: 0 | Status: DISCONTINUED | OUTPATIENT
Start: 2023-10-04 | End: 2023-10-05

## 2023-10-04 RX ADMIN — Medication 2000 UNIT(S): at 13:34

## 2023-10-04 RX ADMIN — LOSARTAN POTASSIUM 100 MILLIGRAM(S): 100 TABLET, FILM COATED ORAL at 05:11

## 2023-10-04 RX ADMIN — Medication 25 MILLIGRAM(S): at 05:11

## 2023-10-04 RX ADMIN — Medication 1 TABLET(S): at 13:33

## 2023-10-04 RX ADMIN — OLANZAPINE 2.5 MILLIGRAM(S): 15 TABLET, FILM COATED ORAL at 17:48

## 2023-10-04 RX ADMIN — QUETIAPINE FUMARATE 25 MILLIGRAM(S): 200 TABLET, FILM COATED ORAL at 17:44

## 2023-10-04 RX ADMIN — Medication 1 APPLICATION(S): at 05:12

## 2023-10-04 RX ADMIN — AMLODIPINE BESYLATE 5 MILLIGRAM(S): 2.5 TABLET ORAL at 05:11

## 2023-10-04 RX ADMIN — BRIMONIDINE TARTRATE 1 DROP(S): 2 SOLUTION/ DROPS OPHTHALMIC at 05:12

## 2023-10-04 RX ADMIN — Medication 100 MILLIGRAM(S): at 21:54

## 2023-10-04 RX ADMIN — QUETIAPINE FUMARATE 25 MILLIGRAM(S): 200 TABLET, FILM COATED ORAL at 05:11

## 2023-10-04 RX ADMIN — Medication 1 APPLICATION(S): at 17:48

## 2023-10-04 RX ADMIN — ESCITALOPRAM OXALATE 10 MILLIGRAM(S): 10 TABLET, FILM COATED ORAL at 13:34

## 2023-10-04 RX ADMIN — Medication 25 MILLIGRAM(S): at 17:45

## 2023-10-04 RX ADMIN — LATANOPROST 1 DROP(S): 0.05 SOLUTION/ DROPS OPHTHALMIC; TOPICAL at 21:54

## 2023-10-04 RX ADMIN — BRIMONIDINE TARTRATE 1 DROP(S): 2 SOLUTION/ DROPS OPHTHALMIC at 17:45

## 2023-10-04 RX ADMIN — Medication 3 MILLIGRAM(S): at 21:54

## 2023-10-04 NOTE — PROGRESS NOTE ADULT - SUBJECTIVE AND OBJECTIVE BOX
[facility], Division of Infectious Diseases  PIPER Moya Y. Patel, S. Shah, G. [facility]  [phone]    Name: CELESTINE NUR  Age: 89y  Gender: Male  MRN: [id number]    Interval History:  Patient seen and examined at bedside  No acute overnight events. Afebrile  Confused  Notes reviewed    Antibiotics:      Medications:  acetaminophen     Tablet .. 650 milliGRAM(s) Oral every 6 hours PRN  aluminum hydroxide/magnesium hydroxide/simethicone Suspension 30 milliLiter(s) Oral every 4 hours PRN  amLODIPine   Tablet 5 milliGRAM(s) Oral daily  ammonium lactate 12% Lotion 1 Application(s) Topical two times a day  brimonidine 0.2% Ophthalmic Solution 1 Drop(s) Both EYES two times a day  calcium carbonate   1250 mG (OsCal) 1 Tablet(s) Oral daily  cholecalciferol 2000 Unit(s) Oral daily  escitalopram 10 milliGRAM(s) Oral daily  hydrALAZINE 25 milliGRAM(s) Oral every 12 hours  hydrALAZINE Injectable 10 milliGRAM(s) IV Push once PRN  hydrochlorothiazide 25 milliGRAM(s) Oral daily  latanoprost 0.005% Ophthalmic Solution 1 Drop(s) Both EYES at bedtime  losartan 100 milliGRAM(s) Oral daily  melatonin 3 milliGRAM(s) Oral at bedtime  OLANZapine Injectable 2.5 milliGRAM(s) IntraMuscular every 6 hours PRN  ondansetron Injectable 4 milliGRAM(s) IV Push every 8 hours PRN  QUEtiapine 25 milliGRAM(s) Oral two times a day  sodium chloride 0.9%. 1000 milliLiter(s) IV Continuous <Continuous>  traZODone 100 milliGRAM(s) Oral at bedtime      Review of Systems:  unable to obtain    Allergies: No Known Allergies    For details regarding the patient's past medical history, social history, family history, and other miscellaneous elements, please refer the initial infectious diseases consultation and/or the admitting history and physical examination for this admission.    Objective:  Vitals:   T(C): 36.6 (10-04-23 @ 04:52), Max: 36.6 (10-03-23 @ 12:03)  HR: 74 (10-04-23 @ 04:52) (63 - 88)  BP: 163/66 (10-04-23 @ 04:52) (95/55 - 163/66)  RR: 19 (10-04-23 @ 04:52) (18 - 19)  SpO2: 93% (10-04-23 @ 04:52) (93% - 95%)    Physical Examination:  General: no acute distress  HEENT: NC/AT, EOMI,  Cardio: RRR  Resp: decreased breath sounds  Abd: soft, NT, ND  Ext: no edema or cyanosis  Skin: warm, dry, no visible rash      Laboratory Studies:  CBC:                       9.6    5.34  )-----------( 99       ( 03 Oct 2023 05:30 )             28.8     CMP: 10-03    137  |  107  |  24<H>  ----------------------------<  102<H>  5.0   |  25  |  0.96    Ca    9.1      03 Oct 2023 05:30        Urinalysis Basic - ( 03 Oct 2023 05:30 )    Color: x / Appearance: x / SG: x / pH: x  Gluc: 102 mg/dL / Ketone: x  / Bili: x / Urobili: x   Blood: x / Protein: x / Nitrite: x   Leuk Esterase: x / RBC: x / WBC x   Sq Epi: x / Non Sq Epi: x / Bacteria: x        Microbiology: reviewed    Culture - Urine (collected 09-29-23 @ 11:09)  Source: Clean Catch Clean Catch (Midstream)  Final Report (10-01-23 @ 21:44):    50,000 - 99,000 CFU/mL Aerococcus species    "Aerococcus spp. are predictably susceptible to penicillin,    ampicillin, tetracycline, and vancomycin.  All isolates are    resistant to sulfonamides"    Culture - Blood (collected 09-28-23 @ 17:05)  Source: .Blood Blood-Peripheral  Final Report (10-04-23 @ 02:01):    No growth at 5 days    Culture - Blood (collected 09-28-23 @ 17:05)  Source: .Blood Blood-Peripheral  Final Report (10-04-23 @ 02:01):    No growth at 5 days          Radiology: reviewed

## 2023-10-04 NOTE — PROGRESS NOTE ADULT - PROBLEM SELECTOR PLAN 3
H/o dysphagia. Unknown etiology  - Diet in nursing home: pureed w/ thicken liquids   - Nutrition eval Staging Info: By selecting yes to the question above you will include information on AJCC 8 tumor staging in your Mohs note. Information on tumor staging will be automatically added for SCCs on the head and neck. AJCC 8 includes tumor size, tumor depth, perineural involvement and bone invasion.

## 2023-10-04 NOTE — PHYSICAL THERAPY INITIAL EVALUATION ADULT - ADDITIONAL COMMENTS
As per chart, patient was able to ambulate with RW and assist short distances, and requires assistance for ADLs.  Patient is a resident in a long term care facility As per chart, patient was able to ambulate with RW and assist short distances, and requires assistance for ADLs.  Patient is a recently at [facility]

## 2023-10-04 NOTE — PHYSICAL THERAPY INITIAL EVALUATION ADULT - PERTINENT HX OF CURRENT PROBLEM, REHAB EVAL
89 y/o M with PMHx vascular dementia, dysphagia, pancytopenia, HTN, GERD, constipation presenting to the ED from [facility] nursing home with worsening agitation and confusion. Patient is minimally verbal at baseline. Most HPI obtained from niece via phone.  He was hospitalized in [place] for a UTI in August. Afterwards, family decided patient could not live alone, and moved him to [facility] here in [place] in late August. Shortly afterwards, patient was admitted to [facility] for a UTI. He was discharge back to the nursing home. However, yesterday and today patient was more agitated and confused. Rocephin was started in nursing home for suspected UTI, and he was transferred to [facility]. Patient unable to participate in ROS, but does not appear in distress or discomfort.

## 2023-10-04 NOTE — PROGRESS NOTE ADULT - SUBJECTIVE AND OBJECTIVE BOX
Patient is a 89y old  Male who presents with a chief complaint of Metabolic Encephalopathy (03 Oct 2023 15:19)      INTERVAL HPI/OVERNIGHT EVENTS: Patient seen and examined at bedside. No overnight events. Patient appears confused at baseline.     MEDICATIONS  (STANDING):  amLODIPine   Tablet 5 milliGRAM(s) Oral daily  ammonium lactate 12% Lotion 1 Application(s) Topical two times a day  brimonidine 0.2% Ophthalmic Solution 1 Drop(s) Both EYES two times a day  calcium carbonate   1250 mG (OsCal) 1 Tablet(s) Oral daily  cholecalciferol 2000 Unit(s) Oral daily  escitalopram 10 milliGRAM(s) Oral daily  hydrALAZINE 25 milliGRAM(s) Oral every 12 hours  hydrochlorothiazide 25 milliGRAM(s) Oral daily  latanoprost 0.005% Ophthalmic Solution 1 Drop(s) Both EYES at bedtime  losartan 100 milliGRAM(s) Oral daily  melatonin 3 milliGRAM(s) Oral at bedtime  QUEtiapine 25 milliGRAM(s) Oral two times a day  sodium chloride 0.9%. 1000 milliLiter(s) (60 mL/Hr) IV Continuous <Continuous>  traZODone 100 milliGRAM(s) Oral at bedtime    MEDICATIONS  (PRN):  acetaminophen     Tablet .. 650 milliGRAM(s) Oral every 6 hours PRN Temp greater or equal to 38C (100.4F), Mild Pain (1 - 3)  aluminum hydroxide/magnesium hydroxide/simethicone Suspension 30 milliLiter(s) Oral every 4 hours PRN Dyspepsia  hydrALAZINE Injectable 10 milliGRAM(s) IV Push once PRN for SBP >150 Manual Check  OLANZapine Injectable 2.5 milliGRAM(s) IntraMuscular every 6 hours PRN Agitation  ondansetron Injectable 4 milliGRAM(s) IV Push every 8 hours PRN Nausea and/or Vomiting      Allergies    No Known Allergies    Intolerances        REVIEW OF SYSTEMS:  Unable to obtain meaningful ROS 2/2 mental status    Vital Signs Last 24 Hrs  T(C): 36.6 (04 Oct 2023 04:52), Max: 36.6 (03 Oct 2023 12:03)  T(F): 97.9 (04 Oct 2023 04:52), Max: 97.9 (04 Oct 2023 04:52)  HR: 74 (04 Oct 2023 04:52) (63 - 88)  BP: 163/66 (04 Oct 2023 04:52) (95/55 - 163/66)  BP(mean): --  RR: 19 (04 Oct 2023 04:52) (18 - 19)  SpO2: 93% (04 Oct 2023 04:52) (93% - 95%)    Parameters below as of 04 Oct 2023 04:52  Patient On (Oxygen Delivery Method): room air        PHYSICAL EXAM:  GENERAL: NAD  HEENT:  anicteric, moist mucous membranes  CHEST/LUNG:  CTA b/l, no rales, wheezes, or rhonchi  HEART:  RRR, S1, S2  ABDOMEN:  BS+, soft, nontender, nondistended  EXTREMITIES: no edema, cyanosis, or calf tenderness  NERVOUS SYSTEM: awake, alert    LABS:    CBC Full  -  ( 03 Oct 2023 05:30 )  WBC Count : 5.34 K/uL  Hemoglobin : 9.6 g/dL  Hematocrit : 28.8 %  Platelet Count - Automated : 99 K/uL  Mean Cell Volume : 88.9 fl  Mean Cell Hemoglobin : 29.6 pg  Mean Cell Hemoglobin Concentration : 33.3 gm/dL  Auto Neutrophil # : x  Auto Lymphocyte # : x  Auto Monocyte # : x  Auto Eosinophil # : x  Auto Basophil # : x  Auto Neutrophil % : x  Auto Lymphocyte % : x  Auto Monocyte % : x  Auto Eosinophil % : x  Auto Basophil % : x      Ca    9.1        03 Oct 2023 05:30        Urinalysis Basic - ( 03 Oct 2023 05:30 )    Color: x / Appearance: x / SG: x / pH: x  Gluc: 102 mg/dL / Ketone: x  / Bili: x / Urobili: x   Blood: x / Protein: x / Nitrite: x   Leuk Esterase: x / RBC: x / WBC x   Sq Epi: x / Non Sq Epi: x / Bacteria: x      CAPILLARY BLOOD GLUCOSE            Culture - Urine (collected 09-29-23 @ 11:09)  Source: Clean Catch Clean Catch (Midstream)  Final Report (10-01-23 @ 21:44):    50,000 - 99,000 CFU/mL Aerococcus species    "Aerococcus spp. are predictably susceptible to penicillin,    ampicillin, tetracycline, and vancomycin.  All isolates are    resistant to sulfonamides"    Culture - Blood (collected 09-28-23 @ 17:05)  Source: .Blood Blood-Peripheral  Final Report (10-04-23 @ 02:01):    No growth at 5 days    Culture - Blood (collected 09-28-23 @ 17:05)  Source: .Blood Blood-Peripheral  Final Report (10-04-23 @ 02:01):    No growth at 5 days        RADIOLOGY & ADDITIONAL TESTS:    Personally reviewed.     Consultant(s) Notes Reviewed:  [x] YES  [ ] NO

## 2023-10-04 NOTE — PHYSICAL THERAPY INITIAL EVALUATION ADULT - GENERAL OBSERVATIONS, REHAB EVAL
Patient received supine in bed, confused, difficult to encourage to participate with physical therapy

## 2023-10-04 NOTE — BH CONSULTATION LIAISON ASSESSMENT NOTE - NSBHCHARTREVIEWVS_PSY_A_CORE FT
Vital Signs Last 24 Hrs  T(C): 36.6 (04 Oct 2023 04:52), Max: 36.6 (04 Oct 2023 04:52)  T(F): 97.9 (04 Oct 2023 04:52), Max: 97.9 (04 Oct 2023 04:52)  HR: 74 (04 Oct 2023 04:52) (74 - 88)  BP: 163/66 (04 Oct 2023 04:52) (95/55 - 163/66)  BP(mean): --  RR: 19 (04 Oct 2023 04:52) (18 - 19)  SpO2: 93% (04 Oct 2023 04:52) (93% - 95%)    Parameters below as of 04 Oct 2023 04:52  Patient On (Oxygen Delivery Method): room air

## 2023-10-04 NOTE — BH CONSULTATION LIAISON ASSESSMENT NOTE - NSBHCHARTREVIEWLAB_PSY_A_CORE FT
9.6    5.34  )-----------( 99       ( 03 Oct 2023 05:30 )             28.8   10-03    137  |  107  |  24<H>  ----------------------------<  102<H>  5.0   |  25  |  0.96    Ca    9.1      03 Oct 2023 05:30

## 2023-10-04 NOTE — PHYSICAL THERAPY INITIAL EVALUATION ADULT - REHAB POTENTIAL, PT EVAL
Pharmacist chart review completed for refill of imatinib indicates no medication or significant health changes since last pharmacist counseling. No questions for the pharmacist. Communicated with patient over phone. Patient's prescription was billed through commercial insurance plan. Medication shipped on 10/25 via [employer] to [address] [place] [address] [place] [facility] [id number] for delivery in 1-2 business days.     Katherine C Hoenecke   [facility]  Phone: [phone]  [email or web address]           
fair, will monitor progress closely

## 2023-10-04 NOTE — PHYSICAL THERAPY INITIAL EVALUATION ADULT - LEVEL OF INDEPENDENCE, REHAB EVAL
Patient resists all bed mobility/maximum assist (25% patients effort)/dependent (less than 25% patients effort)

## 2023-10-04 NOTE — BH CONSULTATION LIAISON ASSESSMENT NOTE - HPI (INCLUDE ILLNESS QUALITY, SEVERITY, DURATION, TIMING, CONTEXT, MODIFYING FACTORS, ASSOCIATED SIGNS AND SYMPTOMS)
Patient seen, evaluated and chart reviewed. Patient is a 89 y/o AAM with PMHx vascular dementia, dysphagia, pancytopenia, HTN, GERD, constipation presenting to the ED from [facility] nursing home with worsening agitation and confusion. Patient is minimally verbal at baseline. Most HPI obtained from niece via phone.  He was hospitalized in [place] for a UTI in August. Afterwards, family decided patient could not live alone, and moved him to [facility] here in [place] in late August. Shortly afterwards, patient was admitted to [facility] for a UTI. He was discharge back to the nursing home. However, yesterday and today patient was more agitated and confused. Rocephin was started in nursing home for suspected UTI, and he was transferred to [facility]. Patient unable to participate in ROS, but does not appear in distress or discomfort. Patient at this time appears to be calm, but is pleasantly confused.

## 2023-10-04 NOTE — BH CONSULTATION LIAISON ASSESSMENT NOTE - CURRENT MEDICATION
MEDICATIONS  (STANDING):  amLODIPine   Tablet 5 milliGRAM(s) Oral daily  ammonium lactate 12% Lotion 1 Application(s) Topical two times a day  brimonidine 0.2% Ophthalmic Solution 1 Drop(s) Both EYES two times a day  calcium carbonate   1250 mG (OsCal) 1 Tablet(s) Oral daily  cholecalciferol 2000 Unit(s) Oral daily  escitalopram 10 milliGRAM(s) Oral daily  hydrALAZINE 25 milliGRAM(s) Oral every 12 hours  hydrochlorothiazide 25 milliGRAM(s) Oral daily  latanoprost 0.005% Ophthalmic Solution 1 Drop(s) Both EYES at bedtime  losartan 100 milliGRAM(s) Oral daily  melatonin 3 milliGRAM(s) Oral at bedtime  QUEtiapine 25 milliGRAM(s) Oral two times a day  sodium chloride 0.9%. 1000 milliLiter(s) (60 mL/Hr) IV Continuous <Continuous>  traZODone 100 milliGRAM(s) Oral at bedtime    MEDICATIONS  (PRN):  acetaminophen     Tablet .. 650 milliGRAM(s) Oral every 6 hours PRN Temp greater or equal to 38C (100.4F), Mild Pain (1 - 3)  aluminum hydroxide/magnesium hydroxide/simethicone Suspension 30 milliLiter(s) Oral every 4 hours PRN Dyspepsia  hydrALAZINE Injectable 10 milliGRAM(s) IV Push once PRN for SBP >150 Manual Check  OLANZapine Injectable 2.5 milliGRAM(s) IntraMuscular every 6 hours PRN Agitation  ondansetron Injectable 4 milliGRAM(s) IV Push every 8 hours PRN Nausea and/or Vomiting

## 2023-10-05 VITALS
TEMPERATURE: 97 F | RESPIRATION RATE: 18 BRPM | OXYGEN SATURATION: 96 % | HEART RATE: 78 BPM | SYSTOLIC BLOOD PRESSURE: 115 MMHG | DIASTOLIC BLOOD PRESSURE: 70 MMHG

## 2023-10-05 PROCEDURE — 99239 HOSP IP/OBS DSCHRG MGMT >30: CPT

## 2023-10-05 RX ORDER — SODIUM CHLORIDE 9 MG/ML
70 INJECTION INTRAMUSCULAR; INTRAVENOUS; SUBCUTANEOUS
Refills: 0 | DISCHARGE

## 2023-10-05 RX ADMIN — LOSARTAN POTASSIUM 100 MILLIGRAM(S): 100 TABLET, FILM COATED ORAL at 06:03

## 2023-10-05 RX ADMIN — BRIMONIDINE TARTRATE 1 DROP(S): 2 SOLUTION/ DROPS OPHTHALMIC at 06:05

## 2023-10-05 RX ADMIN — QUETIAPINE FUMARATE 25 MILLIGRAM(S): 200 TABLET, FILM COATED ORAL at 17:08

## 2023-10-05 RX ADMIN — Medication 25 MILLIGRAM(S): at 06:04

## 2023-10-05 RX ADMIN — OLANZAPINE 2.5 MILLIGRAM(S): 15 TABLET, FILM COATED ORAL at 17:08

## 2023-10-05 RX ADMIN — ESCITALOPRAM OXALATE 10 MILLIGRAM(S): 10 TABLET, FILM COATED ORAL at 12:38

## 2023-10-05 RX ADMIN — Medication 1 APPLICATION(S): at 06:07

## 2023-10-05 RX ADMIN — Medication 1 TABLET(S): at 12:38

## 2023-10-05 RX ADMIN — Medication 25 MILLIGRAM(S): at 17:08

## 2023-10-05 RX ADMIN — QUETIAPINE FUMARATE 25 MILLIGRAM(S): 200 TABLET, FILM COATED ORAL at 06:03

## 2023-10-05 RX ADMIN — OLANZAPINE 2.5 MILLIGRAM(S): 15 TABLET, FILM COATED ORAL at 06:06

## 2023-10-05 RX ADMIN — Medication 1 APPLICATION(S): at 17:08

## 2023-10-05 RX ADMIN — BRIMONIDINE TARTRATE 1 DROP(S): 2 SOLUTION/ DROPS OPHTHALMIC at 17:09

## 2023-10-05 RX ADMIN — AMLODIPINE BESYLATE 5 MILLIGRAM(S): 2.5 TABLET ORAL at 06:03

## 2023-10-05 RX ADMIN — Medication 2000 UNIT(S): at 12:39

## 2023-10-05 NOTE — PROGRESS NOTE ADULT - SUBJECTIVE AND OBJECTIVE BOX
[facility], Division of Infectious Diseases  PIPER Moya Y. Patel, S. Shah, G. [facility]  [phone]    Name: CELESTINE NUR  Age: 89y  Gender: Male  MRN: [id number]    Interval History:  Patient seen and examined at bedside this morning  No acute overnight events. Afebrile  Notes reviewed    Antibiotics:      Medications:  acetaminophen     Tablet .. 650 milliGRAM(s) Oral every 6 hours PRN  aluminum hydroxide/magnesium hydroxide/simethicone Suspension 30 milliLiter(s) Oral every 4 hours PRN  amLODIPine   Tablet 5 milliGRAM(s) Oral daily  ammonium lactate 12% Lotion 1 Application(s) Topical two times a day  brimonidine 0.2% Ophthalmic Solution 1 Drop(s) Both EYES two times a day  calcium carbonate   1250 mG (OsCal) 1 Tablet(s) Oral daily  cholecalciferol 2000 Unit(s) Oral daily  escitalopram 10 milliGRAM(s) Oral daily  hydrALAZINE 25 milliGRAM(s) Oral every 12 hours  hydrALAZINE Injectable 10 milliGRAM(s) IV Push once PRN  hydrochlorothiazide 25 milliGRAM(s) Oral daily  latanoprost 0.005% Ophthalmic Solution 1 Drop(s) Both EYES at bedtime  losartan 100 milliGRAM(s) Oral daily  melatonin 3 milliGRAM(s) Oral at bedtime  OLANZapine 2.5 milliGRAM(s) Oral two times a day  ondansetron Injectable 4 milliGRAM(s) IV Push every 8 hours PRN  QUEtiapine 25 milliGRAM(s) Oral two times a day  sodium chloride 0.9%. 1000 milliLiter(s) IV Continuous <Continuous>  traZODone 100 milliGRAM(s) Oral at bedtime      Review of Systems:  unable to obtain    Allergies: No Known Allergies    For details regarding the patient's past medical history, social history, family history, and other miscellaneous elements, please refer the initial infectious diseases consultation and/or the admitting history and physical examination for this admission.    Objective:  Vitals:   T(C): 36.7 (10-05-23 @ 05:47), Max: 36.7 (10-04-23 @ 21:51)  HR: 90 (10-05-23 @ 05:47) (78 - 90)  BP: 141/61 (10-05-23 @ 05:47) (141/61 - 163/82)  RR: 19 (10-05-23 @ 05:47) (18 - 19)  SpO2: 94% (10-05-23 @ 05:47) (93% - 94%)    Physical Examination:  General: no acute distress  HEENT: NC/AT, EOMI,  Cardio: RRR  Resp: decreased breath sounds  Abd: soft, NT, ND  Neuro: confused  Ext: no edema or cyanosis  Skin: warm, dry, no visible rash      Laboratory Studies:  CBC:   CMP:             Microbiology: reviewed    Culture - Urine (collected 09-29-23 @ 11:09)  Source: Clean Catch Clean Catch (Midstream)  Final Report (10-01-23 @ 21:44):    50,000 - 99,000 CFU/mL Aerococcus species    "Aerococcus spp. are predictably susceptible to penicillin,    ampicillin, tetracycline, and vancomycin.  All isolates are    resistant to sulfonamides"    Culture - Blood (collected 09-28-23 @ 17:05)  Source: .Blood Blood-Peripheral  Final Report (10-04-23 @ 02:01):    No growth at 5 days    Culture - Blood (collected 09-28-23 @ 17:05)  Source: .Blood Blood-Peripheral  Final Report (10-04-23 @ 02:01):    No growth at 5 days          Radiology: reviewed

## 2023-10-05 NOTE — PROGRESS NOTE ADULT - REASON FOR ADMISSION
Metabolic Encephalopathy

## 2023-10-05 NOTE — PROGRESS NOTE ADULT - SUBJECTIVE AND OBJECTIVE BOX
Patient is a 89y old  Male who presents with a chief complaint of Metabolic Encephalopathy (05 Oct 2023 09:18)      INTERVAL HPI/OVERNIGHT EVENTS: Patient seen and examined at bedside. No overnight events. Confused at baseline.     MEDICATIONS  (STANDING):  amLODIPine   Tablet 5 milliGRAM(s) Oral daily  ammonium lactate 12% Lotion 1 Application(s) Topical two times a day  brimonidine 0.2% Ophthalmic Solution 1 Drop(s) Both EYES two times a day  calcium carbonate   1250 mG (OsCal) 1 Tablet(s) Oral daily  cholecalciferol 2000 Unit(s) Oral daily  escitalopram 10 milliGRAM(s) Oral daily  hydrALAZINE 25 milliGRAM(s) Oral every 12 hours  hydrochlorothiazide 25 milliGRAM(s) Oral daily  latanoprost 0.005% Ophthalmic Solution 1 Drop(s) Both EYES at bedtime  losartan 100 milliGRAM(s) Oral daily  melatonin 3 milliGRAM(s) Oral at bedtime  OLANZapine 2.5 milliGRAM(s) Oral two times a day  QUEtiapine 25 milliGRAM(s) Oral two times a day  sodium chloride 0.9%. 1000 milliLiter(s) (60 mL/Hr) IV Continuous <Continuous>  traZODone 100 milliGRAM(s) Oral at bedtime    MEDICATIONS  (PRN):  acetaminophen     Tablet .. 650 milliGRAM(s) Oral every 6 hours PRN Temp greater or equal to 38C (100.4F), Mild Pain (1 - 3)  aluminum hydroxide/magnesium hydroxide/simethicone Suspension 30 milliLiter(s) Oral every 4 hours PRN Dyspepsia  hydrALAZINE Injectable 10 milliGRAM(s) IV Push once PRN for SBP >150 Manual Check  ondansetron Injectable 4 milliGRAM(s) IV Push every 8 hours PRN Nausea and/or Vomiting      Allergies    No Known Allergies    Intolerances        REVIEW OF SYSTEMS:  CONSTITUTIONAL: No fever or chills  HEENT:  No headache, no sore throat  RESPIRATORY: No cough, wheezing, or shortness of breath  CARDIOVASCULAR: No chest pain, palpitations  GASTROINTESTINAL: No abd pain, nausea, vomiting, or diarrhea  GENITOURINARY: No dysuria, frequency, or hematuria  NEUROLOGICAL: no focal weakness or dizziness  MUSCULOSKELETAL: no myalgias     Vital Signs Last 24 Hrs  T(C): 36.7 (05 Oct 2023 05:47), Max: 36.7 (04 Oct 2023 21:51)  T(F): 98 (05 Oct 2023 05:47), Max: 98 (04 Oct 2023 21:51)  HR: 90 (05 Oct 2023 05:47) (78 - 90)  BP: 141/61 (05 Oct 2023 05:47) (141/61 - 163/82)  BP(mean): --  RR: 19 (05 Oct 2023 05:47) (18 - 19)  SpO2: 94% (05 Oct 2023 05:47) (93% - 94%)    Parameters below as of 05 Oct 2023 05:47  Patient On (Oxygen Delivery Method): room air        PHYSICAL EXAM:  GENERAL: NAD  HEENT:  anicteric, moist mucous membranes  CHEST/LUNG:  CTA b/l, no rales, wheezes, or rhonchi  HEART:  RRR, S1, S2  ABDOMEN:  BS+, soft, nontender, nondistended  EXTREMITIES: no edema, cyanosis, or calf tenderness  NERVOUS SYSTEM: awake, alert    LABS:                CAPILLARY BLOOD GLUCOSE            Culture - Urine (collected 09-29-23 @ 11:09)  Source: Clean Catch Clean Catch (Midstream)  Final Report (10-01-23 @ 21:44):    50,000 - 99,000 CFU/mL Aerococcus species    "Aerococcus spp. are predictably susceptible to penicillin,    ampicillin, tetracycline, and vancomycin.  All isolates are    resistant to sulfonamides"    Culture - Blood (collected 09-28-23 @ 17:05)  Source: .Blood Blood-Peripheral  Final Report (10-04-23 @ 02:01):    No growth at 5 days    Culture - Blood (collected 09-28-23 @ 17:05)  Source: .Blood Blood-Peripheral  Final Report (10-04-23 @ 02:01):    No growth at 5 days        RADIOLOGY & ADDITIONAL TESTS:    Personally reviewed.     Consultant(s) Notes Reviewed:  [x] YES  [ ] NO

## 2023-10-05 NOTE — PROGRESS NOTE ADULT - ASSESSMENT
89 y/o M with PMHx vascular dementia, dysphagia, pancytopenia, HTN, GERD, constipation presenting to the ED from [facility] with worsening agitation and confusion.    RECOMMENDATIONS    1-Altered Mental Status at this point of note patient is now FEBRILE, no leukocytosis, clear pulm exam imaging.   Urine collected for urinalysis and rather bland with only 1 WBC   culture collected after antibiotics already started.   Pt not able to give any reliable reports of symptoms so challenging in terms of diagnosis.   Reports of recent UTIs reasonable to treat, urine collected and in lab but so far no results  Ceftriaxone (started 9/28)-continue for now but recs to follow    Thank you for consulting us and involving us in the management of this most interesting and challenging case.  We will follow along in the care of this patient. Please call us at [phone] or text me directly on my cell# at [phone] with any concerns.  
89 y/o M with PMHx vascular dementia, dysphagia, pancytopenia, HTN, GERD, constipation presenting to the ED from [facility] nursing home with worsening agitation and confusion. Admitted for worsening agitation/ fever due to UTI and metabolic encephalopathy 
89 y/o M with PMHx vascular dementia, dysphagia, pancytopenia, HTN, GERD, constipation presenting to the ED from [facility] with worsening agitation and confusion.    Altered Mental Status  Acute Cystitis  patient FEBRILE, no leukocytosis, clear pulm exam imaging.   Urine collected for urinalysis and rather bland with only 1 WBC  UCx Aerococcus  Recommendations:  Pt not able to give any reliable reports of symptoms so challenging in terms of diagnosis.   Reports of recent UTIs reasonable to treat  C/w ceftriaxone (9/28-) to complete x5 day course, last day today    Infectious Diseases will continue to follow. Please call with any questions.   Robyn Lino M.D.  [facility] Division of Infectious Diseases [phone]  For after 5 P.M. and weekends, please call [phone]  
89 y/o M with PMHx vascular dementia, dysphagia, pancytopenia, HTN, GERD, constipation presenting to the ED from [facility] with worsening agitation and confusion.    Altered Mental Status  Acute Cystitis  patient FEBRILE, no leukocytosis, clear pulm exam imaging.   Urine collected for urinalysis and rather bland with only 1 WBC  UCx 50,000 - 99,000 CFU/mL Aerococcus species  Recommendations:  Pt not able to give any reliable reports of symptoms so challenging in terms of diagnosis.   Reports of recent UTIs reasonable to treat  sp ceftriaxone (9/28-) to complete x5 day course, last day 10/2  obs off abx    Thank you for consulting us and involving us in the management of this most interesting and challenging case.  We will follow along in the care of this patient. Please call us at [phone] or text me directly on my cell# at [phone] with any concerns.    
89 y/o M with PMHx vascular dementia, dysphagia, pancytopenia, HTN, GERD, constipation presenting to the ED from [facility] nursing home with worsening agitation and confusion. Admitted for suspected UTI and metabolic encephalopathy 
89 y/o M with PMHx vascular dementia, dysphagia, pancytopenia, HTN, GERD, constipation presenting to the ED from [facility] with worsening agitation and confusion.    Altered Mental Status  Acute Cystitis  patient FEBRILE, no leukocytosis, clear pulm exam imaging.   Urine collected for urinalysis and rather bland with only 1 WBC  UCx 50,000 - 99,000 CFU/mL Aerococcus species    Recommendations:  Reports of recent UTIs reasonable to treat  Now s/p ceftriaxone x5 day course from 9/28-10/2  Can monitor off Abx  Additional care per primary team    Infectious Diseases will continue to follow. Please call with any questions.   Robyn Lino M.D.  \[facility] [place] [facility]\"" Division of Infectious Diseases [phone]  For after 5 P.M. and weekends, please call [phone]    
89 y/o M with PMHx vascular dementia, dysphagia, pancytopenia, HTN, GERD, constipation presenting to the ED from [facility] nursing home with worsening agitation and confusion. Admitted for worsening agitation/ fever due to UTI and metabolic encephalopathy 
89 y/o M with PMHx vascular dementia, dysphagia, pancytopenia, HTN, GERD, constipation presenting to the ED from [facility] with worsening agitation and confusion.    Altered Mental Status  Acute Cystitis  patient FEBRILE, no leukocytosis, clear pulm exam imaging.   Urine collected for urinalysis and rather bland with only 1 WBC  UCx 50,000 - 99,000 CFU/mL Aerococcus species    Recommendations:  S/p ceftriaxone x5 day course from 9/28-10/2  Can monitor off Abx  Additional care per primary team    Infectious Diseases will continue to follow. Please call with any questions.   Robyn Lino M.D.  [facility] Division of Infectious Diseases [phone]  For after 5 P.M. and weekends, please call [phone]    
89 y/o M with PMHx vascular dementia, dysphagia, pancytopenia, HTN, GERD, constipation presenting to the ED from [facility] nursing home with worsening agitation and confusion. Admitted for worsening agitation/ fever due to UTI and metabolic encephalopathy 
89 y/o M with PMHx vascular dementia, dysphagia, pancytopenia, HTN, GERD, constipation presenting to the ED from [facility] with worsening agitation and confusion.    RECOMMENDATIONS    1-Altered Mental Status at this point of note patient is now FEBRILE, no leukocytosis, clear pulm exam imaging.   Urine collected for urinalysis and rather bland   culture collected after antibiotics already started.   Pt not able to give any reliable reports of symptoms so challenging in terms of diagnosis.   Reports of recent UTIs reasonable to treat with Ceftriaxone (started 9/28)-continue for now but recs to follow    Thank you for consulting us and involving us in the management of this most interesting and challenging case.  We will follow along in the care of this patient. Please call us at [phone] or text me directly on my cell# at [phone] with any concerns.  
89 y/o M with PMHx vascular dementia, dysphagia, pancytopenia, HTN, GERD, constipation presenting to the ED from [facility] nursing home with worsening agitation and confusion. Admitted for worsening agitation/ fever due to UTI and metabolic encephalopathy 
89 y/o M with PMHx vascular dementia, dysphagia, pancytopenia, HTN, GERD, constipation presenting to the ED from [facility] nursing home with worsening agitation and confusion. Admitted for worsening agitation/ fever due to UTI and metabolic encephalopathy 
89 y/o M with PMHx vascular dementia, dysphagia, pancytopenia, HTN, GERD, constipation presenting to the ED from [facility] nursing home with worsening agitation and confusion. Admitted for worsening agitation/ fever due to UTI and metabolic encephalopathy

## 2023-10-05 NOTE — PROGRESS NOTE ADULT - PROVIDER SPECIALTY LIST ADULT
Internal Medicine
Infectious Disease
Hospitalist
Infectious Disease
Internal Medicine
Hospitalist
Internal Medicine

## 2023-10-05 NOTE — DISCHARGE NOTE NURSING/CASE MANAGEMENT/SOCIAL WORK - PATIENT PORTAL LINK FT
You can access the FollowMyHealth Patient Portal offered by [facility] by registering at the following website: [email or web address]. By joining [employer]’s FollowMyHealth portal, you will also be able to view your health information using other applications (apps) compatible with our system.

## 2023-10-05 NOTE — SOCIAL WORK PROGRESS NOTE - NSSWPROGRESSNOTE_GEN_ALL_CORE
pt for ret to [facility]. nwems to  pt at 5:15pm. Niece informed and in agreement. No further sw services indicated at this time. plan: RET to [facility] at 5:15

## 2023-10-05 NOTE — PROGRESS NOTE ADULT - NUTRITIONAL ASSESSMENT
This patient has been assessed with a concern for Malnutrition and has been determined to have a diagnosis/diagnoses of Severe protein-calorie malnutrition.    This patient is being managed with:   Diet Pureed-  Mildly Thick Liquids (MILDTHICKLIQS)  Supplement Feeding Modality:  Oral  Ensure Plus High Protein Cans or Servings Per Day:  1       Frequency:  Two Times a day  Entered: Sep 29 2023 11:38AM  

## 2023-11-13 PROCEDURE — 97530 THERAPEUTIC ACTIVITIES: CPT

## 2023-11-13 PROCEDURE — 81001 URINALYSIS AUTO W/SCOPE: CPT

## 2023-11-13 PROCEDURE — 83605 ASSAY OF LACTIC ACID: CPT

## 2023-11-13 PROCEDURE — 85610 PROTHROMBIN TIME: CPT

## 2023-11-13 PROCEDURE — 83550 IRON BINDING TEST: CPT

## 2023-11-13 PROCEDURE — 71045 X-RAY EXAM CHEST 1 VIEW: CPT

## 2023-11-13 PROCEDURE — 80053 COMPREHEN METABOLIC PANEL: CPT

## 2023-11-13 PROCEDURE — 85730 THROMBOPLASTIN TIME PARTIAL: CPT

## 2023-11-13 PROCEDURE — 84466 ASSAY OF TRANSFERRIN: CPT

## 2023-11-13 PROCEDURE — 82533 TOTAL CORTISOL: CPT

## 2023-11-13 PROCEDURE — 84484 ASSAY OF TROPONIN QUANT: CPT

## 2023-11-13 PROCEDURE — 85025 COMPLETE CBC W/AUTO DIFF WBC: CPT

## 2023-11-13 PROCEDURE — 84443 ASSAY THYROID STIM HORMONE: CPT

## 2023-11-13 PROCEDURE — 97110 THERAPEUTIC EXERCISES: CPT

## 2023-11-13 PROCEDURE — 80048 BASIC METABOLIC PNL TOTAL CA: CPT

## 2023-11-13 PROCEDURE — 87040 BLOOD CULTURE FOR BACTERIA: CPT

## 2023-11-13 PROCEDURE — 93005 ELECTROCARDIOGRAM TRACING: CPT

## 2023-11-13 PROCEDURE — 87086 URINE CULTURE/COLONY COUNT: CPT

## 2023-11-13 PROCEDURE — 97162 PT EVAL MOD COMPLEX 30 MIN: CPT

## 2023-11-13 PROCEDURE — 85027 COMPLETE CBC AUTOMATED: CPT

## 2023-11-13 PROCEDURE — 82728 ASSAY OF FERRITIN: CPT

## 2023-11-13 PROCEDURE — 36415 COLL VENOUS BLD VENIPUNCTURE: CPT

## 2023-11-13 PROCEDURE — 96374 THER/PROPH/DIAG INJ IV PUSH: CPT

## 2023-11-13 PROCEDURE — 83540 ASSAY OF IRON: CPT

## 2023-11-13 PROCEDURE — 99285 EMERGENCY DEPT VISIT HI MDM: CPT

## 2023-11-13 PROCEDURE — 0225U NFCT DS DNA&RNA 21 SARSCOV2: CPT

## 2024-11-04 NOTE — DIETITIAN INITIAL EVALUATION ADULT - ETIOLOGY-BASIS
11-04    141  |  102  |  24[H]  ----------------------------<  115[H]  3.5   |  25  |  0.81    Ca    8.6      04 Nov 2024 07:04  Phos  3.0     11-04  Mg     2.60     11-04    TPro  6.8  /  Alb  3.0[L]  /  TBili  3.6[H]  /  DBili  2.6[H]  /  AST  63[H]  /  ALT  133[H]  /  AlkPhos  272[H]  11-04  
Chronic illness

## 2025-07-08 NOTE — PATIENT PROFILE ADULT - VISION (WITH CORRECTIVE LENSES IF THE PATIENT USUALLY WEARS THEM):
89
Partially impaired: cannot see medication labels or newsprint, but can see obstacles in path, and the surrounding layout; can count fingers at arm's length
